# Patient Record
Sex: FEMALE | Race: BLACK OR AFRICAN AMERICAN | NOT HISPANIC OR LATINO | Employment: OTHER | URBAN - METROPOLITAN AREA
[De-identification: names, ages, dates, MRNs, and addresses within clinical notes are randomized per-mention and may not be internally consistent; named-entity substitution may affect disease eponyms.]

---

## 2017-03-12 ENCOUNTER — HOSPITAL ENCOUNTER (EMERGENCY)
Facility: HOSPITAL | Age: 67
Discharge: HOME | End: 2017-03-12
Attending: INTERNAL MEDICINE
Payer: MEDICAID

## 2017-03-12 VITALS
OXYGEN SATURATION: 98 % | BODY MASS INDEX: 22.21 KG/M2 | RESPIRATION RATE: 18 BRPM | DIASTOLIC BLOOD PRESSURE: 76 MMHG | TEMPERATURE: 98 F | HEART RATE: 98 BPM | HEIGHT: 55 IN | WEIGHT: 96 LBS | SYSTOLIC BLOOD PRESSURE: 148 MMHG

## 2017-03-12 DIAGNOSIS — F02.818 DEMENTIA ASSOCIATED WITH OTHER UNDERLYING DISEASE WITH BEHAVIORAL DISTURBANCE: Primary | ICD-10-CM

## 2017-03-12 PROCEDURE — 99283 EMERGENCY DEPT VISIT LOW MDM: CPT

## 2017-03-12 RX ORDER — NAFTIFINE HYDROCHLORIDE 1 MG/G
CREAM TOPICAL 2 TIMES DAILY
COMMUNITY

## 2017-03-12 RX ORDER — RISPERIDONE 1 MG/1
1 TABLET ORAL 2 TIMES DAILY
Qty: 60 TABLET | Refills: 0 | Status: SHIPPED | OUTPATIENT
Start: 2017-03-12

## 2017-03-12 RX ORDER — HYDRALAZINE HYDROCHLORIDE 25 MG/1
25 TABLET, FILM COATED ORAL 2 TIMES DAILY
COMMUNITY

## 2017-03-12 RX ORDER — TESTOSTERONE GEL, 1% 10 MG/G
GEL TRANSDERMAL 2 TIMES DAILY
COMMUNITY

## 2017-03-12 RX ORDER — ASPIRIN 81 MG/1
81 TABLET ORAL DAILY
COMMUNITY

## 2017-03-12 RX ORDER — CARVEDILOL 25 MG/1
25 TABLET ORAL 2 TIMES DAILY WITH MEALS
COMMUNITY

## 2017-03-12 NOTE — ED AVS SNAPSHOT
[facility]  [address] [facility] [address]               Ghada Beaulieu   3/12/2017  5:34 PM   ED    Description:  Female : 1950   Department:  [facility]            Your Care was Coordinated By:     Provider Role From To    Yessi Lagunas MD Attending Provider 17 [id number] --      Reason for Visit     Aggressive Behavior           Diagnoses this Visit        Comments    Dementia associated with other underlying disease with behavioral disturbance    -  Primary       ED Disposition     ED Disposition Condition Comment    Discharge  Patient clinically has no infection.  Seems like has some behavior issues.  Neurology records reviewed.  Patient is off Keppra.  We'll start the patient back on Risperdal which used to be 1 mg twice a day and have the patient follow-up with neurology.           To Do List           Follow-up Information     Follow up with Noe Villatoro MD. Schedule an appointment as soon as possible for a visit in 3 days.    Specialty:  Neurology    Why:  for behavior issues     Contact information:    [address] [facility] [address] [phone]         These Medications        Disp Refills Start End    risperidone (RISPERDAL) 1 MG tablet 60 tablet 0 3/12/2017     Take 1 tablet (1 mg total) by mouth 2 (two) times daily. - Oral      [facility]     [facility] On Call Nurse Care Line -  Assistance  Registered nurses in the [facility] On Call Center provide clinical advisement, health education, appointment booking, and other advisory services.  Call for this free service at [phone].             Medications           Message regarding Medications     Verify the changes and/or additions to your medication regime listed below are the same as discussed with your clinician today.  If any of these changes or additions are incorrect, please notify your healthcare provider.        CHANGE how you are  taking these medications     Start Taking Instead of    risperidone (RISPERDAL) 1 MG tablet risperidone (RISPERDAL) 1 MG tablet    Dosage:  Take 1 tablet (1 mg total) by mouth 2 (two) times daily. Dosage:  Take 1 mg by mouth 2 (two) times daily.      STOP taking these medications     levetiracetam (KEPPRA) 500 MG Tab Take 1 tablet (500 mg total) by mouth 2 (two) times daily.           Verify that the below list of medications is an accurate representation of the medications you are currently taking.  If none reported, the list may be blank. If incorrect, please contact your healthcare provider. Carry this list with you in case of emergency.           Current Medications     albuterol (PROVENTIL) 2.5 mg /3 mL (0.083 %) nebulizer solution Take 2.5 mg by nebulization every 6 (six) hours as needed.    allopurinol (ZYLOPRIM) 100 MG tablet Take 100 mg by mouth once daily.     amlodipine (NORVASC) 5 MG tablet Take 10 mg by mouth once daily.     aspirin (ECOTRIN) 81 MG EC tablet Take 81 mg by mouth once daily.    atorvastatin (LIPITOR) 10 MG tablet Take 10 mg by mouth once daily.    carvedilol (COREG) 25 MG tablet Take 25 mg by mouth 2 (two) times daily with meals.    gabapentin (NEURONTIN) 300 MG capsule Take 300 mg by mouth 3 (three) times daily.    glipiZIDE (GLUCOTROL) 5 MG tablet Take 5 mg by mouth daily with breakfast.     hydrALAZINE (APRESOLINE) 25 MG tablet Take 25 mg by mouth 2 (two) times daily.    hydrochlorothiazide (MICROZIDE) 12.5 mg capsule Take 12.5 mg by mouth once daily.    ipratropium (ATROVENT) 0.02 % nebulizer solution Take 500 mcg by nebulization 4 (four) times daily.    IPRATROPIUM/ALBUTEROL SULFATE (COMBIVENT INHL) Inhale 1 puff into the lungs 2 (two) times daily.     metformin (GLUMETZA) 500 MG (MOD) 24 hr tablet Take 500 mg by mouth 2 (two) times daily with meals.     montelukast (SINGULAIR) 10 mg tablet Take 10 mg by mouth every evening.    naftifine (NAFTIN) 1 % cream Apply topically 2 (two)  "times daily. Aplly small amount twice daily between toes.    testosterone (ANDROGEL) 1 % (50 mg/5 gram) GlPk Apply topically 2 (two) times daily.    valsartan-hydrochlorothiazide (DIOVAN-HCT) 320-12.5 mg per tablet Take 1 tablet by mouth once daily.    guaifenesin 100 mg/5 ml (ROBITUSSIN) 100 mg/5 mL syrup Take 200 mg by mouth 3 (three) times daily as needed.    risperidone (RISPERDAL) 1 MG tablet Take 1 tablet (1 mg total) by mouth 2 (two) times daily.    tolterodine (DETROL) 2 MG Tab Take 1 mg by mouth 2 (two) times daily.    valsartan (DIOVAN) 320 MG tablet Take 320 mg by mouth once daily.           Clinical Reference Information           Your Vitals Were     BP Pulse Temp Resp Height Weight    154/77 (BP Location: Right arm, Patient Position: Sitting) 101 97.9 °F (36.6 °C) (Axillary) 20 4' 6" (1.372 m) 43.5 kg (96 lb)    SpO2 BMI             100% 23.15 kg/m2         Allergies as of 3/12/2017        Reactions    Bactrim [Sulfamethoxazole-trimethoprim]       Immunizations Administered on Date of Encounter - 3/12/2017     None      ED Micro, Lab, POCT     None      ED Imaging Orders     None        Discharge Instructions       Patient clinically has no infection.  Seems like has some behavior issues.  Neurology records reviewed.  Patient is off Keppra.  We'll start the patient back on Risperdal which used to be 1 mg twice a day and have the patient follow-up with neurology.     [facility] complies with applicable Federal civil rights laws and does not discriminate on the basis of race, color, national origin, age, disability, or sex.        Language Assistance Services     ATTENTION: Language assistance services are available, free of charge. Please call [phone].      ATENCIÓN: Si kymberlyla wilberto, tiene a cohen disposición servicios gratuitos de asistencia lingüística. Llame al [phone].     [facility] Ý: N?u b?n nói Ti?ng Vi?t, có các d?ch v? h? tr? ngôn ng? mi?n phí dành cho b?n. G?i s? " [phone].

## 2017-03-12 NOTE — ED NOTES
Patient identifiers verified and correct for Ghada Beaulieu.    LOC: The patient is awake, alert , patient legally blind.  APPEARANCE: Patient  in no acute distress, patient is clean and well groomed, patient's clothing is properly fastened.  SKIN: The skin is warm and dry, color consistent with ethnicity, patient has normal skin turgor and moist mucus membranes, skin intact, no breakdown or bruising noted.  MUSCULOSKELETAL: Patient moving all extremities spontaneously, no obvious swelling or deformities noted.  RESPIRATORY: Airway is open and patent, respirations are spontaneous, patient has a normal effort and rate, no accessory muscle use noted, bilateral breath sounds clear  ABDOMEN: Soft and non tender to palpation, no distention noted, normoactive bowel sounds present in all four quadrants.

## 2017-03-12 NOTE — ED PROVIDER NOTES
"SCRIBE #1 NOTE: I, Jin Asencio, am scribing for, and in the presence of, Yessi Lagunas MD. I have scribed the entire note.      History      Chief Complaint   Patient presents with    Aggressive Behavior     Patients caregiver states patient is non verbal and that shes been hitting herself as if in pain, [place] wanted her to be checked out at the ED        Review of patient's allergies indicates:   Allergen Reactions    Bactrim [sulfamethoxazole-trimethoprim]         HPI   HPI    3/12/2017, 5:35 PM   History obtained from the [facility]      History of Present Illness: Ghada Beaulieu is a 66 y.o. female patient who presents to the Emergency Department for an evaluation of aggressive behavior which onset gradually x2 months ago. Symptoms are episodic and moderate in severity. Pt was accompanied by her sitter who informed me that pt is non-verbal and has dementia. Pt has been reportedly hitting herself and "jumping up and down" in her chair. Pt's sitter states this behavior is new and she brought pt in for an evaluation of her persisting sx. No further complaints or concerns at this time.         Arrival mode: Personal vehicle    PCP: Stacie Ramachandran MD       Past Medical History:  Past Medical History:   Diagnosis Date    Blindness     Bradycardia     Cardiomegaly     Cataract     Cerebral atrophy     Cerebral palsy     Congenital heart disease     Fibrocystic breast     Heart block     Hepatitis B carrier     HTN (hypertension)     Mycotic toenails     Onychomycosis        Past Surgical History:  History reviewed. No pertinent surgical history.      Family History:  Family History   Problem Relation Age of Onset    Family history unknown: Yes       Social History:  Social History     Social History Main Topics    Smoking status: Never Smoker    Smokeless tobacco: Not on file    Alcohol use No    Drug use: No    Sexual activity: No       ROS   Review of Systems   Unable to perform ROS: " "Dementia (Pt is also non-verbal)       Physical Exam    Initial Vitals   BP Pulse Resp Temp SpO2   03/12/17 1723 03/12/17 1723 03/12/17 1723 03/12/17 1723 03/12/17 1723   154/77 101 20 97.9 °F (36.6 °C) 100 %      Physical Exam  Nursing Notes and Vital Signs Reviewed.  Constitutional: Patient is in no acute distress. Awake and at baseline. Well-developed and well-nourished.   Head: Atraumatic. Normocephalic.  Eyes: Pt is blind in both eyes. Conjunctivae are not pale. No scleral icterus.  ENT: Mucous membranes are moist. Oropharynx is clear and symmetric.    Neck: Supple. Full ROM. No lymphadenopathy.  Cardiovascular: Tachycardia. Dystonic and systolic murmurs noted. No rubs or gallops. Distal pulses are 2+ and symmetric.  Pulmonary/Chest: No respiratory distress. Clear to auscultation bilaterally. No wheezing, rales, or rhonchi.  Abdominal: Soft and non-distended.  There is no tenderness.  No rebound, guarding, or rigidity. Good bowel sounds.  Musculoskeletal: Moves all extremities. No obvious deformities. No edema. No calf tenderness.  Skin: Warm and dry.  Neurological:  Awake and at baseline.  Pt is non-verbal.  No acute focal neurological deficits are appreciated.  Psychiatric: Normal affect. Good eye contact. Appropriate in content.    ED Course    Procedures  ED Vital Signs:  Vitals:    03/12/17 1723   BP: (!) 154/77   Pulse: 101   Resp: 20   Temp: 97.9 °F (36.6 °C)   TempSrc: Axillary   SpO2: 100%   Weight: 43.5 kg (96 lb)   Height: 4' 6" (1.372 m)              The Emergency Provider reviewed the vital signs and test results, which are outlined above.    ED Discussion     8:40 PM: Reassessed pt at this time. Pt is awake, alert, and in NAD. Pt's sitter states her condition has improved at this time. Discussed with pt all pertinent ED information and results. Discussed pt dx of dementia with other underlying disease with behavioral disturbance and plan of tx. Gave pt all f/u and return to the ED instructions. " All questions and concerns were addressed at this time. Pt expresses understanding of information and instructions, and is comfortable with plan to discharge. Pt is stable for discharge.    Pre-hypertension/Hypertension: The pt has been informed that they may have pre-hypertension or hypertension based on a blood pressure reading in the ED. I recommend that the pt call the PCP listed on their discharge instructions or a physician of their choice this week to arrange f/u for further evaluation of possible pre-hypertension or hypertension.       ED Medication(s):  Medications - No data to display    New Prescriptions    No medications on file       Follow-up Information     Follow up with Noe Villatoro MD. Schedule an appointment as soon as possible for a visit in 3 days.    Specialty:  Neurology    Why:  for behavior issues     Contact information:    [address] [facility] [address] [phone]              Medical Decision Making              Scribe Attestation:   Scribe #1: I performed the above scribed service and the documentation accurately describes the services I performed. I attest to the accuracy of the note.    Attending:   Physician Attestation Statement for Scribe #1: I, Yessi Lagunas MD, personally performed the services described in this documentation, as scribed by Jin sAencio, in my presence, and it is both accurate and complete.          Clinical Impression       ICD-10-CM ICD-9-CM   1. Dementia associated with other underlying disease with behavioral disturbance F02.81 294.8     294.11       Disposition:   Disposition: Discharged  Condition: Stable    Patient clinically has no infection.  Seems like has some behavior issues.  Neurology records reviewed.  Patient is off Keppra.  We'll start the patient back on Risperdal which used to be 1 mg twice a day and have the patient follow-up with neurology.    MD Yessi Cuevas MD  03/12/17 [id number]

## 2017-03-13 NOTE — DISCHARGE INSTRUCTIONS
Patient clinically has no infection.  Seems like has some behavior issues.  Neurology records reviewed.  Patient is off Keppra.  We'll start the patient back on Risperdal which used to be 1 mg twice a day and have the patient follow-up with neurology.

## 2017-05-10 ENCOUNTER — HOSPITAL ENCOUNTER (INPATIENT)
Facility: HOSPITAL | Age: 67
LOS: 1 days | Discharge: HOME | DRG: 202 | End: 2017-05-12
Attending: EMERGENCY MEDICINE | Admitting: FAMILY MEDICINE
Payer: MEDICAID

## 2017-05-10 DIAGNOSIS — R06.02 SOB (SHORTNESS OF BREATH): ICD-10-CM

## 2017-05-10 DIAGNOSIS — J45.901 ASTHMA EXACERBATION: ICD-10-CM

## 2017-05-10 DIAGNOSIS — J45.41 MODERATE PERSISTENT ASTHMA WITH ACUTE EXACERBATION: ICD-10-CM

## 2017-05-10 DIAGNOSIS — J45.909 ASTHMA: Primary | ICD-10-CM

## 2017-05-10 DIAGNOSIS — R79.89 ELEVATED TROPONIN: ICD-10-CM

## 2017-05-10 PROBLEM — I20.89 ANGINAL EQUIVALENT: Status: ACTIVE | Noted: 2017-05-10

## 2017-05-10 PROBLEM — I10 BENIGN ESSENTIAL HTN: Status: ACTIVE | Noted: 2017-05-10

## 2017-05-10 LAB
ALBUMIN SERPL BCP-MCNC: 3.8 G/DL
ALP SERPL-CCNC: 66 U/L
ALT SERPL W/O P-5'-P-CCNC: 46 U/L
ANION GAP SERPL CALC-SCNC: 9 MMOL/L
AST SERPL-CCNC: 34 U/L
BASOPHILS # BLD AUTO: 0.02 K/UL
BASOPHILS NFR BLD: 0.3 %
BILIRUB SERPL-MCNC: 1.3 MG/DL
BUN SERPL-MCNC: 21 MG/DL
CALCIUM SERPL-MCNC: 10 MG/DL
CHLORIDE SERPL-SCNC: 105 MMOL/L
CO2 SERPL-SCNC: 27 MMOL/L
CREAT SERPL-MCNC: 0.9 MG/DL
DIFFERENTIAL METHOD: ABNORMAL
EOSINOPHIL # BLD AUTO: 0.1 K/UL
EOSINOPHIL NFR BLD: 1.7 %
ERYTHROCYTE [DISTWIDTH] IN BLOOD BY AUTOMATED COUNT: 14.5 %
EST. GFR  (AFRICAN AMERICAN): >60 ML/MIN/1.73 M^2
EST. GFR  (NON AFRICAN AMERICAN): >60 ML/MIN/1.73 M^2
ESTIMATED PA SYSTOLIC PRESSURE: 50
GLOBAL PERICARDIAL EFFUSION: ABNORMAL
GLUCOSE SERPL-MCNC: 114 MG/DL
HCT VFR BLD AUTO: 33 %
HGB BLD-MCNC: 10.9 G/DL
LYMPHOCYTES # BLD AUTO: 1.3 K/UL
LYMPHOCYTES NFR BLD: 21.3 %
MCH RBC QN AUTO: 30.2 PG
MCHC RBC AUTO-ENTMCNC: 33 %
MCV RBC AUTO: 91 FL
MONOCYTES # BLD AUTO: 1.2 K/UL
MONOCYTES NFR BLD: 20.4 %
NEUTROPHILS # BLD AUTO: 3.4 K/UL
NEUTROPHILS NFR BLD: 56.3 %
PLATELET # BLD AUTO: 134 K/UL
PMV BLD AUTO: 10.5 FL
POCT GLUCOSE: 388 MG/DL (ref 70–110)
POTASSIUM SERPL-SCNC: 4.2 MMOL/L
PROT SERPL-MCNC: 7.7 G/DL
RBC # BLD AUTO: 3.61 M/UL
RETIRED EF AND QEF - SEE NOTES: 65 (ref 55–65)
SODIUM SERPL-SCNC: 141 MMOL/L
TRICUSPID VALVE REGURGITATION: ABNORMAL
TROPONIN I SERPL DL<=0.01 NG/ML-MCNC: 0.02 NG/ML
TROPONIN I SERPL DL<=0.01 NG/ML-MCNC: 0.04 NG/ML
WBC # BLD AUTO: 6.02 K/UL

## 2017-05-10 PROCEDURE — 99285 EMERGENCY DEPT VISIT HI MDM: CPT | Mod: 25

## 2017-05-10 PROCEDURE — 25000003 PHARM REV CODE 250: Performed by: EMERGENCY MEDICINE

## 2017-05-10 PROCEDURE — 84484 ASSAY OF TROPONIN QUANT: CPT | Mod: 91

## 2017-05-10 PROCEDURE — 63600175 PHARM REV CODE 636 W HCPCS: Performed by: EMERGENCY MEDICINE

## 2017-05-10 PROCEDURE — G0378 HOSPITAL OBSERVATION PER HR: HCPCS

## 2017-05-10 PROCEDURE — 25000242 PHARM REV CODE 250 ALT 637 W/ HCPCS: Performed by: NURSE PRACTITIONER

## 2017-05-10 PROCEDURE — 93306 TTE W/DOPPLER COMPLETE: CPT | Mod: 26,,, | Performed by: INTERNAL MEDICINE

## 2017-05-10 PROCEDURE — 21400001 HC TELEMETRY ROOM

## 2017-05-10 PROCEDURE — 93306 TTE W/DOPPLER COMPLETE: CPT

## 2017-05-10 PROCEDURE — 80053 COMPREHEN METABOLIC PANEL: CPT

## 2017-05-10 PROCEDURE — 25000003 PHARM REV CODE 250: Performed by: NURSE PRACTITIONER

## 2017-05-10 PROCEDURE — 99204 OFFICE O/P NEW MOD 45 MIN: CPT | Mod: ,,, | Performed by: INTERNAL MEDICINE

## 2017-05-10 PROCEDURE — 25000242 PHARM REV CODE 250 ALT 637 W/ HCPCS: Performed by: EMERGENCY MEDICINE

## 2017-05-10 PROCEDURE — 96372 THER/PROPH/DIAG INJ SC/IM: CPT

## 2017-05-10 PROCEDURE — 36415 COLL VENOUS BLD VENIPUNCTURE: CPT

## 2017-05-10 PROCEDURE — 85025 COMPLETE CBC W/AUTO DIFF WBC: CPT

## 2017-05-10 PROCEDURE — 63600175 PHARM REV CODE 636 W HCPCS: Performed by: NURSE PRACTITIONER

## 2017-05-10 PROCEDURE — 63600175 PHARM REV CODE 636 W HCPCS: Performed by: FAMILY MEDICINE

## 2017-05-10 PROCEDURE — 94640 AIRWAY INHALATION TREATMENT: CPT

## 2017-05-10 PROCEDURE — 84484 ASSAY OF TROPONIN QUANT: CPT

## 2017-05-10 PROCEDURE — 96374 THER/PROPH/DIAG INJ IV PUSH: CPT

## 2017-05-10 PROCEDURE — 93010 ELECTROCARDIOGRAM REPORT: CPT | Mod: ,,, | Performed by: INTERNAL MEDICINE

## 2017-05-10 RX ORDER — VALSARTAN AND HYDROCHLOROTHIAZIDE 320; 12.5 MG/1; MG/1
1 TABLET, FILM COATED ORAL DAILY
Status: DISCONTINUED | OUTPATIENT
Start: 2017-05-11 | End: 2017-05-10 | Stop reason: CLARIF

## 2017-05-10 RX ORDER — ACETAMINOPHEN 325 MG/1
650 TABLET ORAL EVERY 8 HOURS PRN
Status: DISCONTINUED | OUTPATIENT
Start: 2017-05-10 | End: 2017-05-10 | Stop reason: SDUPTHER

## 2017-05-10 RX ORDER — IPRATROPIUM BROMIDE AND ALBUTEROL SULFATE 2.5; .5 MG/3ML; MG/3ML
3 SOLUTION RESPIRATORY (INHALATION)
Status: DISCONTINUED | OUTPATIENT
Start: 2017-05-10 | End: 2017-05-12 | Stop reason: HOSPADM

## 2017-05-10 RX ORDER — FAMOTIDINE 20 MG/1
20 TABLET, FILM COATED ORAL 2 TIMES DAILY
Status: DISCONTINUED | OUTPATIENT
Start: 2017-05-10 | End: 2017-05-10 | Stop reason: SDUPTHER

## 2017-05-10 RX ORDER — CARVEDILOL 12.5 MG/1
25 TABLET ORAL 2 TIMES DAILY WITH MEALS
Status: DISCONTINUED | OUTPATIENT
Start: 2017-05-10 | End: 2017-05-12 | Stop reason: HOSPADM

## 2017-05-10 RX ORDER — GABAPENTIN 300 MG/1
300 CAPSULE ORAL 3 TIMES DAILY
Status: DISCONTINUED | OUTPATIENT
Start: 2017-05-10 | End: 2017-05-12 | Stop reason: HOSPADM

## 2017-05-10 RX ORDER — ACETAMINOPHEN 325 MG/1
650 TABLET ORAL EVERY 6 HOURS PRN
Status: DISCONTINUED | OUTPATIENT
Start: 2017-05-10 | End: 2017-05-12 | Stop reason: HOSPADM

## 2017-05-10 RX ORDER — AMLODIPINE BESYLATE 10 MG/1
10 TABLET ORAL DAILY
Status: DISCONTINUED | OUTPATIENT
Start: 2017-05-11 | End: 2017-05-12 | Stop reason: HOSPADM

## 2017-05-10 RX ORDER — HYDRALAZINE HYDROCHLORIDE 25 MG/1
25 TABLET, FILM COATED ORAL 2 TIMES DAILY
Status: DISCONTINUED | OUTPATIENT
Start: 2017-05-10 | End: 2017-05-12 | Stop reason: HOSPADM

## 2017-05-10 RX ORDER — HYDROCHLOROTHIAZIDE 12.5 MG/1
12.5 TABLET ORAL DAILY
Status: DISCONTINUED | OUTPATIENT
Start: 2017-05-11 | End: 2017-05-12 | Stop reason: HOSPADM

## 2017-05-10 RX ORDER — ENOXAPARIN SODIUM 100 MG/ML
40 INJECTION SUBCUTANEOUS EVERY 24 HOURS
Status: DISCONTINUED | OUTPATIENT
Start: 2017-05-10 | End: 2017-05-10 | Stop reason: SDUPTHER

## 2017-05-10 RX ORDER — ALLOPURINOL 100 MG/1
100 TABLET ORAL DAILY
Status: DISCONTINUED | OUTPATIENT
Start: 2017-05-11 | End: 2017-05-12 | Stop reason: HOSPADM

## 2017-05-10 RX ORDER — IPRATROPIUM BROMIDE AND ALBUTEROL SULFATE 2.5; .5 MG/3ML; MG/3ML
3 SOLUTION RESPIRATORY (INHALATION)
Status: COMPLETED | OUTPATIENT
Start: 2017-05-10 | End: 2017-05-10

## 2017-05-10 RX ORDER — MONTELUKAST SODIUM 10 MG/1
10 TABLET ORAL NIGHTLY
Status: DISCONTINUED | OUTPATIENT
Start: 2017-05-10 | End: 2017-05-12 | Stop reason: HOSPADM

## 2017-05-10 RX ORDER — IPRATROPIUM BROMIDE AND ALBUTEROL SULFATE 2.5; .5 MG/3ML; MG/3ML
3 SOLUTION RESPIRATORY (INHALATION) EVERY 4 HOURS
Status: DISCONTINUED | OUTPATIENT
Start: 2017-05-10 | End: 2017-05-10 | Stop reason: SDUPTHER

## 2017-05-10 RX ORDER — VALSARTAN 80 MG/1
320 TABLET ORAL DAILY
Status: DISCONTINUED | OUTPATIENT
Start: 2017-05-11 | End: 2017-05-12 | Stop reason: HOSPADM

## 2017-05-10 RX ORDER — ASPIRIN 325 MG
325 TABLET, DELAYED RELEASE (ENTERIC COATED) ORAL
Status: COMPLETED | OUTPATIENT
Start: 2017-05-10 | End: 2017-05-10

## 2017-05-10 RX ORDER — RISPERIDONE 1 MG/1
1 TABLET ORAL 2 TIMES DAILY
Status: DISCONTINUED | OUTPATIENT
Start: 2017-05-10 | End: 2017-05-12 | Stop reason: HOSPADM

## 2017-05-10 RX ORDER — ATORVASTATIN CALCIUM 10 MG/1
10 TABLET, FILM COATED ORAL DAILY
Status: DISCONTINUED | OUTPATIENT
Start: 2017-05-11 | End: 2017-05-12 | Stop reason: HOSPADM

## 2017-05-10 RX ORDER — IBUPROFEN 200 MG
24 TABLET ORAL
Status: DISCONTINUED | OUTPATIENT
Start: 2017-05-10 | End: 2017-05-12 | Stop reason: HOSPADM

## 2017-05-10 RX ORDER — ALBUTEROL SULFATE 2.5 MG/.5ML
2.5 SOLUTION RESPIRATORY (INHALATION)
Status: ACTIVE | OUTPATIENT
Start: 2017-05-10 | End: 2017-05-10

## 2017-05-10 RX ORDER — ONDANSETRON 2 MG/ML
4 INJECTION INTRAMUSCULAR; INTRAVENOUS EVERY 12 HOURS PRN
Status: DISCONTINUED | OUTPATIENT
Start: 2017-05-10 | End: 2017-05-12 | Stop reason: HOSPADM

## 2017-05-10 RX ORDER — IBUPROFEN 200 MG
16 TABLET ORAL
Status: DISCONTINUED | OUTPATIENT
Start: 2017-05-10 | End: 2017-05-12 | Stop reason: HOSPADM

## 2017-05-10 RX ORDER — INSULIN ASPART 100 [IU]/ML
0-5 INJECTION, SOLUTION INTRAVENOUS; SUBCUTANEOUS
Status: DISCONTINUED | OUTPATIENT
Start: 2017-05-10 | End: 2017-05-11

## 2017-05-10 RX ORDER — GLUCAGON 1 MG
1 KIT INJECTION
Status: DISCONTINUED | OUTPATIENT
Start: 2017-05-10 | End: 2017-05-12 | Stop reason: HOSPADM

## 2017-05-10 RX ORDER — ENOXAPARIN SODIUM 100 MG/ML
40 INJECTION SUBCUTANEOUS EVERY 24 HOURS
Status: DISCONTINUED | OUTPATIENT
Start: 2017-05-10 | End: 2017-05-12 | Stop reason: HOSPADM

## 2017-05-10 RX ORDER — AMOXICILLIN 250 MG
1 CAPSULE ORAL 2 TIMES DAILY
Status: DISCONTINUED | OUTPATIENT
Start: 2017-05-10 | End: 2017-05-12 | Stop reason: HOSPADM

## 2017-05-10 RX ORDER — FAMOTIDINE 20 MG/1
20 TABLET, FILM COATED ORAL DAILY
Status: DISCONTINUED | OUTPATIENT
Start: 2017-05-10 | End: 2017-05-12 | Stop reason: HOSPADM

## 2017-05-10 RX ORDER — ASPIRIN 81 MG/1
81 TABLET ORAL DAILY
Status: DISCONTINUED | OUTPATIENT
Start: 2017-05-11 | End: 2017-05-12 | Stop reason: HOSPADM

## 2017-05-10 RX ADMIN — FAMOTIDINE 20 MG: 20 TABLET ORAL at 05:05

## 2017-05-10 RX ADMIN — MONTELUKAST SODIUM 10 MG: 10 TABLET, FILM COATED ORAL at 10:05

## 2017-05-10 RX ADMIN — IPRATROPIUM BROMIDE AND ALBUTEROL SULFATE 3 ML: .5; 3 SOLUTION RESPIRATORY (INHALATION) at 01:05

## 2017-05-10 RX ADMIN — METHYLPREDNISOLONE SODIUM SUCCINATE 125 MG: 125 INJECTION, POWDER, FOR SOLUTION INTRAMUSCULAR; INTRAVENOUS at 01:05

## 2017-05-10 RX ADMIN — IPRATROPIUM BROMIDE AND ALBUTEROL SULFATE 3 ML: .5; 3 SOLUTION RESPIRATORY (INHALATION) at 04:05

## 2017-05-10 RX ADMIN — RISPERIDONE 1 MG: 1 TABLET, FILM COATED ORAL at 10:05

## 2017-05-10 RX ADMIN — INSULIN ASPART 3 UNITS: 100 INJECTION, SOLUTION INTRAVENOUS; SUBCUTANEOUS at 10:05

## 2017-05-10 RX ADMIN — ENOXAPARIN SODIUM 40 MG: 100 INJECTION SUBCUTANEOUS at 05:05

## 2017-05-10 RX ADMIN — IPRATROPIUM BROMIDE AND ALBUTEROL SULFATE 3 ML: .5; 3 SOLUTION RESPIRATORY (INHALATION) at 07:05

## 2017-05-10 RX ADMIN — REGULAR STRENGTH 325 MG: 325 TABLET ORAL at 02:05

## 2017-05-10 RX ADMIN — GABAPENTIN 300 MG: 300 CAPSULE ORAL at 10:05

## 2017-05-10 RX ADMIN — STANDARDIZED SENNA CONCENTRATE AND DOCUSATE SODIUM 1 TABLET: 8.6; 5 TABLET, FILM COATED ORAL at 10:05

## 2017-05-10 RX ADMIN — CARVEDILOL 25 MG: 12.5 TABLET, FILM COATED ORAL at 06:05

## 2017-05-10 RX ADMIN — METHYLPREDNISOLONE SODIUM SUCCINATE 40 MG: 125 INJECTION, POWDER, FOR SOLUTION INTRAMUSCULAR; INTRAVENOUS at 11:05

## 2017-05-10 RX ADMIN — METHYLPREDNISOLONE SODIUM SUCCINATE 40 MG: 125 INJECTION, POWDER, FOR SOLUTION INTRAMUSCULAR; INTRAVENOUS at 06:05

## 2017-05-10 NOTE — ED PROVIDER NOTES
SCRIBE #1 NOTE: I, Salvatore Verma, am scribing for, and in the presence of, Helena Schrader MD. I have scribed the entire note.      History      Chief Complaint   Patient presents with    Shortness of Breath     Patient c/o SOB and wheezing for the last 2 days       Review of patient's allergies indicates:   Allergen Reactions    Bactrim [sulfamethoxazole-trimethoprim]         HPI   HPI    5/10/2017, 1:12 PM   History obtained from the [facility]      History of Present Illness limited due to pt non verbal : Ghada Beaulieu is a 66 y.o. female patient with a PMHx of asthma, cerebral palsy, who presents to the Emergency Department for wheezing which onset gradually 2 days ago. Sxs are constant and moderate in severity. There are no mitigating or exacerbating factors noted. Associated sxs include cough. Family denies any fever, N/V/D, CP, LOC, fall, and all other sxs at this time. No further complaints or concerns at this time.       Arrival mode: Personal vehicle      PCP: Stacie Ramachandran MD       Past Medical History:  Past Medical History:   Diagnosis Date    Blindness     Bradycardia     Cardiomegaly     Cataract     Cerebral atrophy     Cerebral palsy     Congenital heart disease     Fibrocystic breast     Heart block     Hepatitis B carrier     HTN (hypertension)     Mycotic toenails     Onychomycosis        Past Surgical History:  History reviewed. No pertinent surgical history.      Family History:  Family History   Problem Relation Age of Onset    Family history unknown: Yes       Social History:  Social History     Social History Main Topics    Smoking status: Never Smoker    Smokeless tobacco: unknown    Alcohol use No    Drug use: No    Sexual activity: No       ROS   Review of Systems   Unable to perform ROS: Patient nonverbal       Physical Exam    Initial Vitals   BP Pulse Resp Temp SpO2   05/10/17 1308 05/10/17 1308 05/10/17 1308 05/10/17 1308 05/10/17 1308   167/69  "93 22 98.4 °F (36.9 °C) 94 %      Physical Exam  Nursing Notes and Vital Signs Reviewed.  Constitutional: Patient is in no acute distress. Awake and alert. Well-developed and well-nourished.  Head: Atraumatic. Normocephalic.  Eyes: PERRL. EOM intact. Conjunctivae are not pale. No scleral icterus.  ENT: Mucous membranes are moist. Oropharynx is clear and symmetric.    Neck: Supple. Full ROM. No lymphadenopathy.  Cardiovascular: Regular rate. Regular rhythm. No murmurs, rubs, or gallops. Distal pulses are 2+ and symmetric.  Pulmonary/Chest: No respiratory distress. Expiratory wheezing. No rales, or rhonchi.  Abdominal: Soft and non-distended.  There is no tenderness.  No rebound, guarding, or rigidity. Good bowel sounds.  Genitourinary: No CVA tenderness  Musculoskeletal: Moves all extremities. No obvious deformities. No edema. No calf tenderness.  Skin: Warm and dry.  Neurological:  Alert, awake, and appropriate.  Normal speech.  No acute focal neurological deficits are appreciated.  Psychiatric: Normal affect. Good eye contact. Appropriate in content.    ED Course    Procedures  ED Vital Signs:  Vitals:    05/10/17 1308 05/10/17 1334 05/10/17 1337 05/10/17 1341   BP: (!) 167/69      Pulse: 93 89 88 89   Resp: (!) 22 20 (!) 22 20   Temp: 98.4 °F (36.9 °C)      TempSrc: Tympanic      SpO2: (!) 94% 100% 100% 100%   Weight: 39.5 kg (87 lb)      Height: 4' 8" (1.422 m)       05/10/17 1532   BP: (!) 146/79   Pulse: 94   Resp: (!) 21   Temp:    TempSrc:    SpO2: 96%   Weight:    Height:        Abnormal Lab Results:  Labs Reviewed   CBC W/ AUTO DIFFERENTIAL - Abnormal; Notable for the following:        Result Value    RBC 3.61 (*)     Hemoglobin 10.9 (*)     Hematocrit 33.0 (*)     Platelets 134 (*)     Mono # 1.2 (*)     Mono% 20.4 (*)     All other components within normal limits   COMPREHENSIVE METABOLIC PANEL - Abnormal; Notable for the following:     Glucose 114 (*)     Total Bilirubin 1.3 (*)     ALT 46 (*)     All " other components within normal limits   TROPONIN I - Abnormal; Notable for the following:     Troponin I 0.038 (*)     All other components within normal limits        All Lab Results:  Results for orders placed or performed during the hospital encounter of 05/10/17   CBC auto differential   Result Value Ref Range    WBC 6.02 3.90 - 12.70 K/uL    RBC 3.61 (L) 4.00 - 5.40 M/uL    Hemoglobin 10.9 (L) 12.0 - 16.0 g/dL    Hematocrit 33.0 (L) 37.0 - 48.5 %    MCV 91 82 - 98 fL    MCH 30.2 27.0 - 31.0 pg    MCHC 33.0 32.0 - 36.0 %    RDW 14.5 11.5 - 14.5 %    Platelets 134 (L) 150 - 350 K/uL    MPV 10.5 9.2 - 12.9 fL    Gran # 3.4 1.8 - 7.7 K/uL    Lymph # 1.3 1.0 - 4.8 K/uL    Mono # 1.2 (H) 0.3 - 1.0 K/uL    Eos # 0.1 0.0 - 0.5 K/uL    Baso # 0.02 0.00 - 0.20 K/uL    Gran% 56.3 38.0 - 73.0 %    Lymph% 21.3 18.0 - 48.0 %    Mono% 20.4 (H) 4.0 - 15.0 %    Eosinophil% 1.7 0.0 - 8.0 %    Basophil% 0.3 0.0 - 1.9 %    Differential Method Automated    Comprehensive metabolic panel   Result Value Ref Range    Sodium 141 136 - 145 mmol/L    Potassium 4.2 3.5 - 5.1 mmol/L    Chloride 105 95 - 110 mmol/L    CO2 27 23 - 29 mmol/L    Glucose 114 (H) 70 - 110 mg/dL    BUN, Bld 21 8 - 23 mg/dL    Creatinine 0.9 0.5 - 1.4 mg/dL    Calcium 10.0 8.7 - 10.5 mg/dL    Total Protein 7.7 6.0 - 8.4 g/dL    Albumin 3.8 3.5 - 5.2 g/dL    Total Bilirubin 1.3 (H) 0.1 - 1.0 mg/dL    Alkaline Phosphatase 66 55 - 135 U/L    AST 34 10 - 40 U/L    ALT 46 (H) 10 - 44 U/L    Anion Gap 9 8 - 16 mmol/L    eGFR if African American >60 >60 mL/min/1.73 m^2    eGFR if non African American >60 >60 mL/min/1.73 m^2   Troponin I #1   Result Value Ref Range    Troponin I 0.038 (H) 0.000 - 0.026 ng/mL         Imaging Results:  Imaging Results         X-Ray Chest AP Portable (Final result) Result time:  05/10/17 14:16:31    Final result by Ash Watts MD (05/10/17 14:16:31)    Impression:     Cardiomegaly..      Electronically signed by: ASH WATTS  MD  Date:     05/10/17  Time:    14:16     Narrative:    History: Chest pain    Cardiomegaly which appears more pronounced compared to 2/1/16. No infiltrates.                    The EKG was ordered, reviewed, and independently interpreted by the ED provider.  Interpretation time: 13:15  Rate: 92 BPM  Rhythm: normal sinus rhythm  Interpretation: TONY. RVH. No STEMI.      The Emergency Provider reviewed the vital signs and test results, which are outlined above.    ED Discussion     5/10/2017 1400 PM Pt continues to wheeze, moving more air after initial treatment    2:31 PM: Dr. Riley discussed the pt's case with Dr. Tena (Cardiology) who agrees with plan of Tx and will see pt in ED.    2:46 PM: Discussed case with Jennifer Moreno NP (Hospital Medicine). Jennifer agrees with current care and management of pt and accepts admission.   Admitting Service: Hospital medicine   Admitting Physician: Dr. Renner  Admit to: Observation    2:46 PM: Re-evaluated pt. I have discussed test results, shared treatment plan, and the need for admission with family at bedside. Family express understanding at this time and agree with all information. All questions answered. Family has no further questions or concerns at this time. Pt is ready for admit.      ED Medication(s):  Medications   albuterol sulfate nebulizer solution 2.5 mg ( Nebulization Canceled Entry 5/10/17 1410)   methylPREDNISolone sod suc(PF) 125 mg/2 mL injection 125 mg (125 mg Intravenous Given 5/10/17 1340)   albuterol-ipratropium 2.5mg-0.5mg/3mL nebulizer solution 3 mL (3 mLs Nebulization Given by Other 5/10/17 1341)   aspirin EC tablet 325 mg (325 mg Oral Given 5/10/17 1446)       New Prescriptions    No medications on file             Medical Decision Making    Medical Decision Making:   Clinical Tests:   Lab Tests: Ordered and Reviewed  Radiological Study: Reviewed and Ordered  Medical Tests: Ordered and Reviewed           Scribe Attestation:   Scribe #1: I performed the  above scribed service and the documentation accurately describes the services I performed. I attest to the accuracy of the note.    Attending:   Physician Attestation Statement for Scribe #1: I, Helena Schrader MD, personally performed the services described in this documentation, as scribed by Salvatore Verma, in my presence, and it is both accurate and complete.          Clinical Impression       ICD-10-CM ICD-9-CM   1. SOB (shortness of breath) R06.02 786.05   2. Asthma exacerbation J45.901 493.92   3. Elevated troponin R74.8 790.6       Disposition:   Disposition: Placed in Observation  Condition: Fair         Helena Schrader MD  05/10/17 1543

## 2017-05-10 NOTE — CONSULTS
Consult Note  Cardiology    Consult Requested By: Dr. Schrader  Reason for Consult: SOB, elevated troponin     SUBJECTIVE:     History of Present Illness:  Patient is a 66 y.o. nonverbal  female with PMH of cardiomegaly, CP, blindness,  congenital heart disease, Hep B and HTN. Patient is a resident at a group home. She presented to the ED with SOB and wheezing. Group [place] rep states that patient had sob and wheezing this morning and was given nebulizer treatment. Had improvement in symptoms. She went to her doctors appt and upon returning, symptoms returned. This has been going on for about 2 days now. Staff deny any fever or chills. She is followed by Dr. Blue in [place]. Noted to have mild troponin leak. Has chronic ST abnormality to anterolateral leads. She has been treated with nebulizer and Solumedrol in the ED. CXR is unchanged. 2D echo in Nov 2015 shows normal LVF with no noted  valve abnormalities.     Review of patient's allergies indicates:   Allergen Reactions    Bactrim [sulfamethoxazole-trimethoprim]        Past Medical History:   Diagnosis Date    Blindness     Bradycardia     Cardiomegaly     Cataract     Cerebral atrophy     Cerebral palsy     Congenital heart disease     Fibrocystic breast     Heart block     Hepatitis B carrier     HTN (hypertension)     Mycotic toenails     Onychomycosis      History reviewed. No pertinent surgical history.  Family History   Problem Relation Age of Onset    Family history unknown: Yes     Social History   Substance Use Topics    Smoking status: Never Smoker    Smokeless tobacco: None    Alcohol use No        Review of Systems:  Unable to perform full ROS due to patient being nonverbal. Some history provided by group home staff       Allergic/Immunologic: negative    OBJECTIVE:     Vital Signs (Most Recent)  Temp: 98.4 °F (36.9 °C) (05/10/17 1308)  Pulse: 94 (05/10/17 1532)  Resp: (!) 21 (05/10/17 1532)  BP: (!) 146/79 (05/10/17 1532)  SpO2: 96  % (05/10/17 1532)    Vital Signs Range (Last 24H):  Temp:  [98.4 °F (36.9 °C)]   Pulse:  [88-94]   Resp:  [20-22]   BP: (146-167)/(69-79)   SpO2:  [94 %-100 %]     Current Facility-Administered Medications   Medication    acetaminophen tablet 650 mg    albuterol-ipratropium 2.5mg-0.5mg/3mL nebulizer solution 3 mL    enoxaparin injection 40 mg    famotidine tablet 20 mg    ondansetron injection 4 mg    senna-docusate 8.6-50 mg per tablet 1 tablet     Current Outpatient Prescriptions   Medication Sig    albuterol (PROVENTIL) 2.5 mg /3 mL (0.083 %) nebulizer solution Take 2.5 mg by nebulization every 6 (six) hours as needed.    allopurinol (ZYLOPRIM) 100 MG tablet Take 100 mg by mouth once daily.     amlodipine (NORVASC) 5 MG tablet Take 10 mg by mouth once daily.     aspirin (ECOTRIN) 81 MG EC tablet Take 81 mg by mouth once daily.    atorvastatin (LIPITOR) 10 MG tablet Take 10 mg by mouth once daily.    carvedilol (COREG) 25 MG tablet Take 25 mg by mouth 2 (two) times daily with meals.    gabapentin (NEURONTIN) 300 MG capsule Take 300 mg by mouth 3 (three) times daily.    glipiZIDE (GLUCOTROL) 5 MG tablet Take 5 mg by mouth daily with breakfast.     guaifenesin 100 mg/5 ml (ROBITUSSIN) 100 mg/5 mL syrup Take 200 mg by mouth 3 (three) times daily as needed.    hydrALAZINE (APRESOLINE) 25 MG tablet Take 25 mg by mouth 2 (two) times daily.    hydrochlorothiazide (MICROZIDE) 12.5 mg capsule Take 12.5 mg by mouth once daily.    ipratropium (ATROVENT) 0.02 % nebulizer solution Take 500 mcg by nebulization 4 (four) times daily.    IPRATROPIUM/ALBUTEROL SULFATE (COMBIVENT INHL) Inhale 1 puff into the lungs 2 (two) times daily.     metformin (GLUMETZA) 500 MG (MOD) 24 hr tablet Take 500 mg by mouth 2 (two) times daily with meals.     montelukast (SINGULAIR) 10 mg tablet Take 10 mg by mouth every evening.    naftifine (NAFTIN) 1 % cream Apply topically 2 (two) times daily. Aplly small amount twice daily  between toes.    risperidone (RISPERDAL) 1 MG tablet Take 1 tablet (1 mg total) by mouth 2 (two) times daily.    testosterone (ANDROGEL) 1 % (50 mg/5 gram) GlPk Apply topically 2 (two) times daily.    tolterodine (DETROL) 2 MG Tab Take 1 mg by mouth 2 (two) times daily.    valsartan (DIOVAN) 320 MG tablet Take 320 mg by mouth once daily.    valsartan-hydrochlorothiazide (DIOVAN-HCT) 320-12.5 mg per tablet Take 1 tablet by mouth once daily.     No current facility-administered medications on file prior to encounter.      Current Outpatient Prescriptions on File Prior to Encounter   Medication Sig    albuterol (PROVENTIL) 2.5 mg /3 mL (0.083 %) nebulizer solution Take 2.5 mg by nebulization every 6 (six) hours as needed.    allopurinol (ZYLOPRIM) 100 MG tablet Take 100 mg by mouth once daily.     amlodipine (NORVASC) 5 MG tablet Take 10 mg by mouth once daily.     aspirin (ECOTRIN) 81 MG EC tablet Take 81 mg by mouth once daily.    atorvastatin (LIPITOR) 10 MG tablet Take 10 mg by mouth once daily.    carvedilol (COREG) 25 MG tablet Take 25 mg by mouth 2 (two) times daily with meals.    gabapentin (NEURONTIN) 300 MG capsule Take 300 mg by mouth 3 (three) times daily.    glipiZIDE (GLUCOTROL) 5 MG tablet Take 5 mg by mouth daily with breakfast.     guaifenesin 100 mg/5 ml (ROBITUSSIN) 100 mg/5 mL syrup Take 200 mg by mouth 3 (three) times daily as needed.    hydrALAZINE (APRESOLINE) 25 MG tablet Take 25 mg by mouth 2 (two) times daily.    hydrochlorothiazide (MICROZIDE) 12.5 mg capsule Take 12.5 mg by mouth once daily.    ipratropium (ATROVENT) 0.02 % nebulizer solution Take 500 mcg by nebulization 4 (four) times daily.    IPRATROPIUM/ALBUTEROL SULFATE (COMBIVENT INHL) Inhale 1 puff into the lungs 2 (two) times daily.     metformin (GLUMETZA) 500 MG (MOD) 24 hr tablet Take 500 mg by mouth 2 (two) times daily with meals.     montelukast (SINGULAIR) 10 mg tablet Take 10 mg by mouth every evening.     naftifine (NAFTIN) 1 % cream Apply topically 2 (two) times daily. Aplly small amount twice daily between toes.    risperidone (RISPERDAL) 1 MG tablet Take 1 tablet (1 mg total) by mouth 2 (two) times daily.    testosterone (ANDROGEL) 1 % (50 mg/5 gram) GlPk Apply topically 2 (two) times daily.    tolterodine (DETROL) 2 MG Tab Take 1 mg by mouth 2 (two) times daily.    valsartan (DIOVAN) 320 MG tablet Take 320 mg by mouth once daily.    valsartan-hydrochlorothiazide (DIOVAN-HCT) 320-12.5 mg per tablet Take 1 tablet by mouth once daily.       Physical Exam:  General appearance: alert, appears stated age, nonverbal   Head: Normocephalic, without obvious abnormality, atraumatic  Neck: no adenopathy, no carotid bruit, no JVD, supple  Lungs: wheezing to auscultation throughout   Chest wall: no tenderness  Heart: regular rate and rhythm, S1, S2 normal, loud murmur throughout chest.   Abdomen: soft, non-tender; bowel sounds normal; no masses,  no organomegaly  Extremities: extremities normal, atraumatic, no cyanosis or edema  Pulses: DP/PT 2+ and symmetric  Skin: Skin color, texture, turgor normal. No rashes or lesions  Neurologic: nonverbal patient with hx of Cerebral palsy     Laboratory:  Chemistry:   Lab Results   Component Value Date     05/10/2017    K 4.2 05/10/2017     05/10/2017    CO2 27 05/10/2017    BUN 21 05/10/2017    CREATININE 0.9 05/10/2017    CALCIUM 10.0 05/10/2017     Cardiac Markers:   Lab Results   Component Value Date    CKMB Unable to Calculate 03/18/2013    TROPONINI 0.038 (H) 05/10/2017    TROPONINI <0.04 03/18/2013     Cardiac Markers (Last 3):   Lab Results   Component Value Date    CKMB Unable to Calculate 03/18/2013    CKMB 0.8 03/18/2013    CKMB Unable to Calculate 03/17/2013    TROPONINI 0.038 (H) 05/10/2017    TROPONINI 0.026 02/01/2016    TROPONINI <0.006 11/13/2015    TROPONINI <0.04 03/18/2013    TROPONINI <0.04 03/18/2013    TROPONINI <0.04 03/17/2013     CBC:   Lab  Results   Component Value Date    WBC 6.02 05/10/2017    HGB 10.9 (L) 05/10/2017    HCT 33.0 (L) 05/10/2017    MCV 91 05/10/2017     (L) 05/10/2017     Lipids:   Lab Results   Component Value Date    CHOL 124 11/13/2015    TRIG 53 11/13/2015    HDL 51 11/13/2015     Coagulation:   Lab Results   Component Value Date    INR 1.1 02/01/2016    APTT 25.9 11/13/2015       Diagnostic Results:  ECG: Reviewed  X-Ray: Reviewed  Echo: Reviewed      ASSESSMENT/PLAN:   Patient presented to the ED with asthmas exacerbation. She has been treated with nebulizer and steroids in the ED. Recommend checking ABG and continue nebs and steroids. Mild troponin leak likely secondary to hypoxia. Trend cardiac enzymes. Repeat 2D echo. Don't recommend Heparin gtt at this time. Will request records from Cardiologist,  Dr. Blue in [place]. Group home staff to provide medical notes as well.     Chart reviewed. Patient examined by Dr. Tena and agrees with plan that has been outlined.

## 2017-05-10 NOTE — H&P
[facility] Hospital Medicine  History & Physical    Patient Name: Ghada Beaulieu  MRN: [id number]  Admission Date: 5/10/2017  Attending Physician: No att. providers found   Primary Care Provider: Stacie Ramachandran MD         Patient information was obtained from patient and ER records.     Subjective:     Principal Problem:<principal problem not specified>    Chief Complaint:   Chief Complaint   Patient presents with    Shortness of Breath     Patient c/o SOB and wheezing for the last 2 days        HPI: Ghada Beaulieu is a 66 y.o. female patient with a PMH of asthma, cerebral palsy, blindness,  congenital heart disease, Hep B and HTN who presents to the ER with c/o wheezing that began 2 days ago. The patient is non-verbal therefore HPI is limited. The patient lives in a group home and was taken to her PCP by one of her caretakers for wheezing with a Hx of asthma, and was subsequently referred to the ER. In the ER the patient was found to have mildly elevated troponin. The case was discussed with Cardiology who recommended Observation and trending troponins.     Past Medical History:   Diagnosis Date    Asthma     Blindness     Bradycardia     Cardiomegaly     Cataract     Cerebral atrophy     Cerebral palsy     Congenital heart disease     Fibrocystic breast     Heart block     Hepatitis B carrier     HTN (hypertension)     Mycotic toenails     Onychomycosis        History reviewed. No pertinent surgical history.    Review of patient's allergies indicates:   Allergen Reactions    Bactrim [sulfamethoxazole-trimethoprim]        No current facility-administered medications on file prior to encounter.      Current Outpatient Prescriptions on File Prior to Encounter   Medication Sig    albuterol (PROVENTIL) 2.5 mg /3 mL (0.083 %) nebulizer solution Take 2.5 mg by nebulization every 6 (six) hours as needed.    allopurinol (ZYLOPRIM) 100 MG tablet Take 100 mg by mouth once daily.      amlodipine (NORVASC) 5 MG tablet Take 10 mg by mouth once daily.     aspirin (ECOTRIN) 81 MG EC tablet Take 81 mg by mouth once daily.    atorvastatin (LIPITOR) 10 MG tablet Take 10 mg by mouth once daily.    carvedilol (COREG) 25 MG tablet Take 25 mg by mouth 2 (two) times daily with meals.    gabapentin (NEURONTIN) 300 MG capsule Take 300 mg by mouth 3 (three) times daily.    glipiZIDE (GLUCOTROL) 5 MG tablet Take 5 mg by mouth daily with breakfast.     guaifenesin 100 mg/5 ml (ROBITUSSIN) 100 mg/5 mL syrup Take 200 mg by mouth 3 (three) times daily as needed.    hydrALAZINE (APRESOLINE) 25 MG tablet Take 25 mg by mouth 2 (two) times daily.    hydrochlorothiazide (MICROZIDE) 12.5 mg capsule Take 12.5 mg by mouth once daily.    ipratropium (ATROVENT) 0.02 % nebulizer solution Take 500 mcg by nebulization 4 (four) times daily.    IPRATROPIUM/ALBUTEROL SULFATE (COMBIVENT INHL) Inhale 1 puff into the lungs 2 (two) times daily.     metformin (GLUMETZA) 500 MG (MOD) 24 hr tablet Take 500 mg by mouth 2 (two) times daily with meals.     montelukast (SINGULAIR) 10 mg tablet Take 10 mg by mouth every evening.    naftifine (NAFTIN) 1 % cream Apply topically 2 (two) times daily. Aplly small amount twice daily between toes.    risperidone (RISPERDAL) 1 MG tablet Take 1 tablet (1 mg total) by mouth 2 (two) times daily.    testosterone (ANDROGEL) 1 % (50 mg/5 gram) GlPk Apply topically 2 (two) times daily.    tolterodine (DETROL) 2 MG Tab Take 1 mg by mouth 2 (two) times daily.    valsartan (DIOVAN) 320 MG tablet Take 320 mg by mouth once daily.    valsartan-hydrochlorothiazide (DIOVAN-HCT) 320-12.5 mg per tablet Take 1 tablet by mouth once daily.     Family History     Family history is unknown by patient.        Social History Main Topics    Smoking status: Never Smoker    Smokeless tobacco: Not on file    Alcohol use No    Drug use: No    Sexual activity: No     Review of Systems   Unable to perform  ROS: Patient nonverbal   Respiratory: Positive for wheezing.      Objective:     Vital Signs (Most Recent):  Temp: 98.4 °F (36.9 °C) (05/10/17 1308)  Pulse: 101 (05/10/17 1647)  Resp: (!) 22 (05/10/17 1647)  BP: (!) 157/63 (05/10/17 1647)  SpO2: 95 % (05/10/17 1647) Vital Signs (24h Range):  Temp:  [98.4 °F (36.9 °C)] 98.4 °F (36.9 °C)  Pulse:  [] 101  Resp:  [20-22] 22  SpO2:  [94 %-100 %] 95 %  BP: (146-167)/(63-79) 157/63     Weight: 39.5 kg (87 lb)  Body mass index is 19.51 kg/(m^2).    Physical Exam   Constitutional: She appears well-developed and well-nourished.   HENT:   Head: Normocephalic and atraumatic.   Eyes: Conjunctivae and EOM are normal. Pupils are equal, round, and reactive to light.   Neck: Normal range of motion. Neck supple.   Cardiovascular: Normal rate, regular rhythm, normal heart sounds and intact distal pulses.    No murmur heard.  Pulmonary/Chest: Effort normal. No respiratory distress. She has wheezes.   Expiratory wheezing noted   Abdominal: Soft. Bowel sounds are normal. She exhibits no distension. There is no tenderness.   Musculoskeletal: Normal range of motion. She exhibits no edema, tenderness or deformity.   Neurological: She is alert.   Non-verbal   Skin: Skin is warm and dry. No erythema.   Psychiatric: She has a normal mood and affect. Her behavior is normal.   Nursing note and vitals reviewed.       Significant Labs: All pertinent labs within the past 24 hours have been reviewed.    Significant Imaging:   Imaging Results         X-Ray Chest AP Portable (Final result) Result time:  05/10/17 14:16:31    Final result by Ash Watts MD (05/10/17 14:16:31)    Impression:     Cardiomegaly..      Electronically signed by: ASH WATTS MD  Date:     05/10/17  Time:    14:16     Narrative:    History: Chest pain    Cardiomegaly which appears more pronounced compared to 2/1/16. No infiltrates.                 Assessment/Plan:     SOB (shortness of breath)  - supplemental  O2  - neb tx  - pulse ox       Asthma  - supplemental O2  - neb tx  - pulse ox   - IV steroids      Anginal equivalent  - Admit to observation   - Cardiology consulted   - trend troponin   - resume ASA, statin, BB      Benign essential HTN  - monitor VS   - resume home meds      VTE Risk Mitigation         Ordered     Medium Risk of VTE  Once      05/10/17 1722     enoxaparin injection 40 mg  Daily     Route:  Subcutaneous        05/10/17 1549     Place sequential compression device  Until discontinued      05/10/17 1549        Jovanny Dempsey NP  Department of Hospital Medicine   [facility]

## 2017-05-10 NOTE — IP AVS SNAPSHOT
[address] [place] [address]           Patient Discharge Instructions   Our goal is to set you up for success. This packet includes information on your condition, medications, and your home care.  It will help you care for yourself to prevent having to return to the hospital.     Please ask your nurse if you have any questions.      There are many details to remember when preparing to leave the hospital. Here is what you will need to do:    1. Take your medicine. If you are prescribed medications, review your Medication List on the following pages. You may have new medications to  at the pharmacy and others that you'll need to stop taking. Review the instructions for how and when to take your medications. Talk with your doctor or nurses if you are unsure of what to do.     2. Go to your follow-up appointments. Specific follow-up information is listed in the following pages. Your may be contacted by a nurse or clinical provider about future appointments. Be sure we have all of the phone numbers to reach you. Please contact your provider's office if you are unable to make an appointment.     3. Watch for warning signs. Your doctor or nurse will give you detailed warning signs to watch for and when to call for assistance. These instructions may also include educational information about your condition. If you experience any of warning signs to your health, call your doctor.               ** Verify the list of medication(s) below is accurate and up to date. Carry this with you in case of emergency. If your medications have changed, please notify your healthcare provider.             Medication List      START taking these medications        Additional Info                      predniSONE 20 MG tablet   Commonly known as:  DELTASONE   Quantity:  5 tablet   Refills:  0   Dose:  20 mg    Instructions:  Take 1 tablet (20 mg total) by mouth once daily.     Begin Date     AM    Noon    PM    Bedtime         CONTINUE taking these medications        Additional Info                      albuterol 2.5 mg /3 mL (0.083 %) nebulizer solution   Commonly known as:  PROVENTIL   Refills:  0   Dose:  2.5 mg    Instructions:  Take 2.5 mg by nebulization every 6 (six) hours as needed.     Begin Date    AM    Noon    PM    Bedtime       allopurinol 100 MG tablet   Commonly known as:  ZYLOPRIM   Refills:  0   Dose:  100 mg    Last time this was given:  100 mg on 5/12/2017  9:00 AM   Instructions:  Take 100 mg by mouth once daily.     Begin Date    AM    Noon    PM    Bedtime       amlodipine 5 MG tablet   Commonly known as:  NORVASC   Refills:  0   Dose:  10 mg    Last time this was given:  10 mg on 5/12/2017  9:01 AM   Instructions:  Take 10 mg by mouth once daily.     Begin Date    AM    Noon    PM    Bedtime       aspirin 81 MG EC tablet   Commonly known as:  ECOTRIN   Refills:  0   Dose:  81 mg    Last time this was given:  81 mg on 5/12/2017  9:00 AM   Instructions:  Take 81 mg by mouth once daily.     Begin Date    AM    Noon    PM    Bedtime       atorvastatin 10 MG tablet   Commonly known as:  LIPITOR   Refills:  0   Dose:  10 mg    Last time this was given:  10 mg on 5/12/2017  9:00 AM   Instructions:  Take 10 mg by mouth once daily.     Begin Date    AM    Noon    PM    Bedtime       carvedilol 25 MG tablet   Commonly known as:  COREG   Refills:  0   Dose:  25 mg    Last time this was given:  25 mg on 5/12/2017  8:45 AM   Instructions:  Take 25 mg by mouth 2 (two) times daily with meals.     Begin Date    AM    Noon    PM    Bedtime       COMBIVENT INHL   Refills:  0   Dose:  1 puff    Instructions:  Inhale 1 puff into the lungs 2 (two) times daily.     Begin Date    AM    Noon    PM    Bedtime       gabapentin 300 MG capsule   Commonly known as:  NEURONTIN   Refills:  0   Dose:  300 mg    Last time this was given:  300 mg on 5/12/2017  2:27 PM   Instructions:  Take 300 mg by mouth 3  (three) times daily.     Begin Date    AM    Noon    PM    Bedtime       glipiZIDE 5 MG tablet   Commonly known as:  GLUCOTROL   Refills:  0   Dose:  5 mg    Instructions:  Take 5 mg by mouth daily with breakfast.     Begin Date    AM    Noon    PM    Bedtime       guaifenesin 100 mg/5 ml 100 mg/5 mL syrup   Commonly known as:  ROBITUSSIN   Refills:  0   Dose:  200 mg    Instructions:  Take 200 mg by mouth 3 (three) times daily as needed.     Begin Date    AM    Noon    PM    Bedtime       hydrALAZINE 25 MG tablet   Commonly known as:  APRESOLINE   Refills:  0   Dose:  25 mg    Last time this was given:  25 mg on 5/12/2017  9:00 AM   Instructions:  Take 25 mg by mouth 2 (two) times daily.     Begin Date    AM    Noon    PM    Bedtime       ipratropium 0.02 % nebulizer solution   Commonly known as:  ATROVENT   Refills:  0   Dose:  500 mcg    Instructions:  Take 500 mcg by nebulization 4 (four) times daily.     Begin Date    AM    Noon    PM    Bedtime       metformin 500 MG (MOD) 24 hr tablet   Commonly known as:  GLUMETZA   Refills:  0   Dose:  500 mg    Instructions:  Take 500 mg by mouth 2 (two) times daily with meals.     Begin Date    AM    Noon    PM    Bedtime       montelukast 10 mg tablet   Commonly known as:  SINGULAIR   Refills:  0   Dose:  10 mg    Last time this was given:  10 mg on 5/11/2017  9:51 PM   Instructions:  Take 10 mg by mouth every evening.     Begin Date    AM    Noon    PM    Bedtime       naftifine 1 % cream   Commonly known as:  NAFTIN   Refills:  0    Instructions:  Apply topically 2 (two) times daily. Aplly small amount twice daily between toes.     Begin Date    AM    Noon    PM    Bedtime       risperidone 1 MG tablet   Commonly known as:  RISPERDAL   Quantity:  60 tablet   Refills:  0   Dose:  1 mg    Last time this was given:  1 mg on 5/12/2017  9:00 AM   Instructions:  Take 1 tablet (1 mg total) by mouth 2 (two) times daily.     Begin Date    AM    Noon    PM    Bedtime        testosterone 1 % (50 mg/5 gram) Glpk   Commonly known as:  ANDROGEL   Refills:  0    Instructions:  Apply topically 2 (two) times daily.     Begin Date    AM    Noon    PM    Bedtime       tolterodine 2 MG Tab   Commonly known as:  DETROL   Refills:  0   Dose:  1 mg    Instructions:  Take 1 mg by mouth 2 (two) times daily.     Begin Date    AM    Noon    PM    Bedtime       valsartan-hydrochlorothiazide 320-12.5 mg per tablet   Commonly known as:  DIOVAN-HCT   Refills:  0   Dose:  1 tablet    Instructions:  Take 1 tablet by mouth once daily.     Begin Date    AM    Noon    PM    Bedtime         STOP taking these medications     valsartan 320 MG tablet   Commonly known as:  DIOVAN            Where to Get Your Medications      You can get these medications from any pharmacy     Bring a paper prescription for each of these medications     predniSONE 20 MG tablet                  Please bring to all follow up appointments:    1. A copy of your discharge instructions.  2. All medicines you are currently taking in their original bottles.  3. Identification and insurance card.    Please arrive 15 minutes ahead of scheduled appointment time.    Please call 24 hours in advance if you must reschedule your appointment and/or time.        Follow-up Information     Follow up with Stacie Ramachandran MD In 3 days.    Specialty:  Internal Medicine    Why:  Please call to schedule an appointment    Contact information:    [address] [place] [id number] [phone]          Discharge Instructions     Future Orders    Activity as tolerated     Call MD for:  difficulty breathing or increased cough     Call MD for:  increased confusion or weakness     Call MD for:  persistent dizziness, light-headedness, or visual disturbances     Call MD for:  persistent nausea and vomiting or diarrhea     Call MD for:  temperature >100.4     Diet general     Questions:    Total calories:      Fat restriction, if any:      Protein  "restriction, if any:      Na restriction, if any:      Fluid restriction:      Additional restrictions:      Diet general     Questions:    Total calories:      Fat restriction, if any:      Protein restriction, if any:      Na restriction, if any:      Fluid restriction:      Additional restrictions:          Primary Diagnosis     Your primary diagnosis was:  Asthma      Admission Information     Date & Time Provider Department CSN    5/10/2017  1:11 PM Keesha Renner MD [facility] -  [id number]      Care Providers     Provider Role Specialty Primary office phone    Keesha Renner MD Attending Provider Family Medicine [phone]    Keesha Renner MD Team Attending  Family Medicine [phone]      Your Vitals Were     BP Pulse Temp Resp    123/53 (BP Location: Left arm, Patient Position: Lying, BP Method: Automatic) 90 97.7 °F (36.5 °C) (Axillary) 20    Height Weight SpO2 BMI    4' 8" (1.422 m) 37.6 kg (82 lb 14.4 oz) 98% 18.59 kg/m2      Recent Lab Values     No lab values to display.      Pending Labs     Order Current Status    Hemoglobin A1c In process      Allergies as of 5/12/2017        Reactions    Bactrim [Sulfamethoxazole-trimethoprim]       [facility]     [facility] Nurse Care Line - 24/7 Assistance  Unless otherwise directed by your provider, please contact [facility], our nurse care line that is available for 24/7 assistance.     Registered nurses in the [facility] provide clinical advisement, health education, appointment booking, and other advisory services.  Call for this free service at [phone].        Advance Directives     An advance directive is a document which, in the event you are no longer able to make decisions for yourself, tells your healthcare team what kind of treatment you do or do not want to receive, or who you would like to make those decisions for you.  If you do not currently have an advance directive, [facility] encourages you " to create one.  For more information call:  [phone] ([phone]),  or log on to [email or web address].        Language Assistance Services     ATTENTION: Language assistance services are available, free of charge. Please call [phone].      ATENCIÓN: Si habla español, tiene a cohen disposición servicios gratuitos de asistencia lingüística. Llame al [phone].     [facility] Ý: N?u b?n nói Ti?ng Vi?t, có các d?ch v? h? tr? ngôn ng? mi?n phí dành cho b?n. G?i s? [phone].        Stroke Education               [facility] -  complies with applicable Federal civil rights laws and does not discriminate on the basis of race, color, national origin, age, disability, or sex.

## 2017-05-10 NOTE — SUBJECTIVE & OBJECTIVE
Past Medical History:   Diagnosis Date    Asthma     Blindness     Bradycardia     Cardiomegaly     Cataract     Cerebral atrophy     Cerebral palsy     Congenital heart disease     Fibrocystic breast     Heart block     Hepatitis B carrier     HTN (hypertension)     Mycotic toenails     Onychomycosis        History reviewed. No pertinent surgical history.    Review of patient's allergies indicates:   Allergen Reactions    Bactrim [sulfamethoxazole-trimethoprim]        No current facility-administered medications on file prior to encounter.      Current Outpatient Prescriptions on File Prior to Encounter   Medication Sig    albuterol (PROVENTIL) 2.5 mg /3 mL (0.083 %) nebulizer solution Take 2.5 mg by nebulization every 6 (six) hours as needed.    allopurinol (ZYLOPRIM) 100 MG tablet Take 100 mg by mouth once daily.     amlodipine (NORVASC) 5 MG tablet Take 10 mg by mouth once daily.     aspirin (ECOTRIN) 81 MG EC tablet Take 81 mg by mouth once daily.    atorvastatin (LIPITOR) 10 MG tablet Take 10 mg by mouth once daily.    carvedilol (COREG) 25 MG tablet Take 25 mg by mouth 2 (two) times daily with meals.    gabapentin (NEURONTIN) 300 MG capsule Take 300 mg by mouth 3 (three) times daily.    glipiZIDE (GLUCOTROL) 5 MG tablet Take 5 mg by mouth daily with breakfast.     guaifenesin 100 mg/5 ml (ROBITUSSIN) 100 mg/5 mL syrup Take 200 mg by mouth 3 (three) times daily as needed.    hydrALAZINE (APRESOLINE) 25 MG tablet Take 25 mg by mouth 2 (two) times daily.    hydrochlorothiazide (MICROZIDE) 12.5 mg capsule Take 12.5 mg by mouth once daily.    ipratropium (ATROVENT) 0.02 % nebulizer solution Take 500 mcg by nebulization 4 (four) times daily.    IPRATROPIUM/ALBUTEROL SULFATE (COMBIVENT INHL) Inhale 1 puff into the lungs 2 (two) times daily.     metformin (GLUMETZA) 500 MG (MOD) 24 hr tablet Take 500 mg by mouth 2 (two) times daily with meals.     montelukast (SINGULAIR) 10 mg tablet  Take 10 mg by mouth every evening.    naftifine (NAFTIN) 1 % cream Apply topically 2 (two) times daily. Aplly small amount twice daily between toes.    risperidone (RISPERDAL) 1 MG tablet Take 1 tablet (1 mg total) by mouth 2 (two) times daily.    testosterone (ANDROGEL) 1 % (50 mg/5 gram) GlPk Apply topically 2 (two) times daily.    tolterodine (DETROL) 2 MG Tab Take 1 mg by mouth 2 (two) times daily.    valsartan (DIOVAN) 320 MG tablet Take 320 mg by mouth once daily.    valsartan-hydrochlorothiazide (DIOVAN-HCT) 320-12.5 mg per tablet Take 1 tablet by mouth once daily.     Family History     Family history is unknown by patient.        Social History Main Topics    Smoking status: Never Smoker    Smokeless tobacco: Not on file    Alcohol use No    Drug use: No    Sexual activity: No     Review of Systems   Unable to perform ROS: Patient nonverbal   Respiratory: Positive for wheezing.      Objective:     Vital Signs (Most Recent):  Temp: 98.4 °F (36.9 °C) (05/10/17 1308)  Pulse: 101 (05/10/17 1647)  Resp: (!) 22 (05/10/17 1647)  BP: (!) 157/63 (05/10/17 1647)  SpO2: 95 % (05/10/17 1647) Vital Signs (24h Range):  Temp:  [98.4 °F (36.9 °C)] 98.4 °F (36.9 °C)  Pulse:  [] 101  Resp:  [20-22] 22  SpO2:  [94 %-100 %] 95 %  BP: (146-167)/(63-79) 157/63     Weight: 39.5 kg (87 lb)  Body mass index is 19.51 kg/(m^2).    Physical Exam   Constitutional: She appears well-developed and well-nourished.   HENT:   Head: Normocephalic and atraumatic.   Eyes: Conjunctivae and EOM are normal. Pupils are equal, round, and reactive to light.   Neck: Normal range of motion. Neck supple.   Cardiovascular: Normal rate, regular rhythm, normal heart sounds and intact distal pulses.    No murmur heard.  Pulmonary/Chest: Effort normal. No respiratory distress. She has wheezes.   Expiratory wheezing noted   Abdominal: Soft. Bowel sounds are normal. She exhibits no distension. There is no tenderness.   Musculoskeletal: Normal  range of motion. She exhibits no edema, tenderness or deformity.   Neurological: She is alert.   Non-verbal   Skin: Skin is warm and dry. No erythema.   Psychiatric: She has a normal mood and affect. Her behavior is normal.   Nursing note and vitals reviewed.       Significant Labs: All pertinent labs within the past 24 hours have been reviewed.    Significant Imaging:   Imaging Results         X-Ray Chest AP Portable (Final result) Result time:  05/10/17 14:16:31    Final result by Ash Watts MD (05/10/17 14:16:31)    Impression:     Cardiomegaly..      Electronically signed by: ASH WATTS MD  Date:     05/10/17  Time:    14:16     Narrative:    History: Chest pain    Cardiomegaly which appears more pronounced compared to 2/1/16. No infiltrates.

## 2017-05-11 LAB
ALBUMIN SERPL BCP-MCNC: 2.9 G/DL
ALP SERPL-CCNC: 58 U/L
ALT SERPL W/O P-5'-P-CCNC: 33 U/L
ANION GAP SERPL CALC-SCNC: 9 MMOL/L
AST SERPL-CCNC: 23 U/L
BASOPHILS # BLD AUTO: 0 K/UL
BASOPHILS NFR BLD: 0 %
BILIRUB SERPL-MCNC: 0.7 MG/DL
BUN SERPL-MCNC: 27 MG/DL
CALCIUM SERPL-MCNC: 9.1 MG/DL
CHLORIDE SERPL-SCNC: 104 MMOL/L
CO2 SERPL-SCNC: 23 MMOL/L
CREAT SERPL-MCNC: 1 MG/DL
DIFFERENTIAL METHOD: ABNORMAL
EOSINOPHIL # BLD AUTO: 0 K/UL
EOSINOPHIL NFR BLD: 0 %
ERYTHROCYTE [DISTWIDTH] IN BLOOD BY AUTOMATED COUNT: 15 %
EST. GFR  (AFRICAN AMERICAN): >60 ML/MIN/1.73 M^2
EST. GFR  (NON AFRICAN AMERICAN): 60 ML/MIN/1.73 M^2
GLUCOSE SERPL-MCNC: 292 MG/DL
HCT VFR BLD AUTO: 31.7 %
HGB BLD-MCNC: 10.6 G/DL
LYMPHOCYTES # BLD AUTO: 0.5 K/UL
LYMPHOCYTES NFR BLD: 14.2 %
MAGNESIUM SERPL-MCNC: 1.8 MG/DL
MCH RBC QN AUTO: 29.8 PG
MCHC RBC AUTO-ENTMCNC: 33.4 %
MCV RBC AUTO: 89 FL
MONOCYTES # BLD AUTO: 0.1 K/UL
MONOCYTES NFR BLD: 2.1 %
NEUTROPHILS # BLD AUTO: 3.1 K/UL
NEUTROPHILS NFR BLD: 83.7 %
PHOSPHATE SERPL-MCNC: 4.3 MG/DL
PLATELET # BLD AUTO: 132 K/UL
PMV BLD AUTO: 10.9 FL
POCT GLUCOSE: 274 MG/DL (ref 70–110)
POCT GLUCOSE: 300 MG/DL (ref 70–110)
POCT GLUCOSE: 350 MG/DL (ref 70–110)
POTASSIUM SERPL-SCNC: 4 MMOL/L
PROT SERPL-MCNC: 6.4 G/DL
RBC # BLD AUTO: 3.56 M/UL
SODIUM SERPL-SCNC: 136 MMOL/L
TROPONIN I SERPL DL<=0.01 NG/ML-MCNC: 0.02 NG/ML
WBC # BLD AUTO: 3.74 K/UL

## 2017-05-11 PROCEDURE — 63600175 PHARM REV CODE 636 W HCPCS: Performed by: FAMILY MEDICINE

## 2017-05-11 PROCEDURE — 80053 COMPREHEN METABOLIC PANEL: CPT

## 2017-05-11 PROCEDURE — 25000003 PHARM REV CODE 250: Performed by: EMERGENCY MEDICINE

## 2017-05-11 PROCEDURE — 36415 COLL VENOUS BLD VENIPUNCTURE: CPT

## 2017-05-11 PROCEDURE — 83735 ASSAY OF MAGNESIUM: CPT

## 2017-05-11 PROCEDURE — 21400001 HC TELEMETRY ROOM

## 2017-05-11 PROCEDURE — 84100 ASSAY OF PHOSPHORUS: CPT

## 2017-05-11 PROCEDURE — 94640 AIRWAY INHALATION TREATMENT: CPT

## 2017-05-11 PROCEDURE — 11000001 HC ACUTE MED/SURG PRIVATE ROOM

## 2017-05-11 PROCEDURE — 25000242 PHARM REV CODE 250 ALT 637 W/ HCPCS: Performed by: NURSE PRACTITIONER

## 2017-05-11 PROCEDURE — 84484 ASSAY OF TROPONIN QUANT: CPT

## 2017-05-11 PROCEDURE — 85025 COMPLETE CBC W/AUTO DIFF WBC: CPT

## 2017-05-11 PROCEDURE — 63600175 PHARM REV CODE 636 W HCPCS: Performed by: EMERGENCY MEDICINE

## 2017-05-11 PROCEDURE — 25000003 PHARM REV CODE 250: Performed by: NURSE PRACTITIONER

## 2017-05-11 RX ORDER — INSULIN ASPART 100 [IU]/ML
1-10 INJECTION, SOLUTION INTRAVENOUS; SUBCUTANEOUS
Status: DISCONTINUED | OUTPATIENT
Start: 2017-05-11 | End: 2017-05-12 | Stop reason: HOSPADM

## 2017-05-11 RX ADMIN — ENOXAPARIN SODIUM 40 MG: 100 INJECTION SUBCUTANEOUS at 06:05

## 2017-05-11 RX ADMIN — IPRATROPIUM BROMIDE AND ALBUTEROL SULFATE 3 ML: .5; 3 SOLUTION RESPIRATORY (INHALATION) at 08:05

## 2017-05-11 RX ADMIN — INSULIN ASPART 3 UNITS: 100 INJECTION, SOLUTION INTRAVENOUS; SUBCUTANEOUS at 12:05

## 2017-05-11 RX ADMIN — HYDRALAZINE HYDROCHLORIDE 25 MG: 25 TABLET, FILM COATED ORAL at 08:05

## 2017-05-11 RX ADMIN — INSULIN ASPART 8 UNITS: 100 INJECTION, SOLUTION INTRAVENOUS; SUBCUTANEOUS at 06:05

## 2017-05-11 RX ADMIN — FAMOTIDINE 20 MG: 20 TABLET ORAL at 08:05

## 2017-05-11 RX ADMIN — METHYLPREDNISOLONE SODIUM SUCCINATE 40 MG: 125 INJECTION, POWDER, FOR SOLUTION INTRAMUSCULAR; INTRAVENOUS at 11:05

## 2017-05-11 RX ADMIN — MONTELUKAST SODIUM 10 MG: 10 TABLET, FILM COATED ORAL at 09:05

## 2017-05-11 RX ADMIN — ATORVASTATIN CALCIUM 10 MG: 10 TABLET, FILM COATED ORAL at 08:05

## 2017-05-11 RX ADMIN — GABAPENTIN 300 MG: 300 CAPSULE ORAL at 06:05

## 2017-05-11 RX ADMIN — ALLOPURINOL 100 MG: 100 TABLET ORAL at 08:05

## 2017-05-11 RX ADMIN — GABAPENTIN 300 MG: 300 CAPSULE ORAL at 03:05

## 2017-05-11 RX ADMIN — METHYLPREDNISOLONE SODIUM SUCCINATE 40 MG: 125 INJECTION, POWDER, FOR SOLUTION INTRAMUSCULAR; INTRAVENOUS at 12:05

## 2017-05-11 RX ADMIN — VALSARTAN 320 MG: 80 TABLET, FILM COATED ORAL at 08:05

## 2017-05-11 RX ADMIN — STANDARDIZED SENNA CONCENTRATE AND DOCUSATE SODIUM 1 TABLET: 8.6; 5 TABLET, FILM COATED ORAL at 09:05

## 2017-05-11 RX ADMIN — IPRATROPIUM BROMIDE AND ALBUTEROL SULFATE 3 ML: .5; 3 SOLUTION RESPIRATORY (INHALATION) at 07:05

## 2017-05-11 RX ADMIN — CARVEDILOL 25 MG: 12.5 TABLET, FILM COATED ORAL at 08:05

## 2017-05-11 RX ADMIN — GABAPENTIN 300 MG: 300 CAPSULE ORAL at 09:05

## 2017-05-11 RX ADMIN — IPRATROPIUM BROMIDE AND ALBUTEROL SULFATE 3 ML: .5; 3 SOLUTION RESPIRATORY (INHALATION) at 01:05

## 2017-05-11 RX ADMIN — AMLODIPINE BESYLATE 10 MG: 10 TABLET ORAL at 08:05

## 2017-05-11 RX ADMIN — HYDRALAZINE HYDROCHLORIDE 25 MG: 25 TABLET, FILM COATED ORAL at 09:05

## 2017-05-11 RX ADMIN — RISPERIDONE 1 MG: 1 TABLET, FILM COATED ORAL at 09:05

## 2017-05-11 RX ADMIN — HYDROCHLOROTHIAZIDE 12.5 MG: 12.5 TABLET ORAL at 08:05

## 2017-05-11 RX ADMIN — METHYLPREDNISOLONE SODIUM SUCCINATE 40 MG: 125 INJECTION, POWDER, FOR SOLUTION INTRAMUSCULAR; INTRAVENOUS at 05:05

## 2017-05-11 RX ADMIN — INSULIN ASPART 3 UNITS: 100 INJECTION, SOLUTION INTRAVENOUS; SUBCUTANEOUS at 05:05

## 2017-05-11 RX ADMIN — STANDARDIZED SENNA CONCENTRATE AND DOCUSATE SODIUM 1 TABLET: 8.6; 5 TABLET, FILM COATED ORAL at 08:05

## 2017-05-11 RX ADMIN — METHYLPREDNISOLONE SODIUM SUCCINATE 40 MG: 125 INJECTION, POWDER, FOR SOLUTION INTRAMUSCULAR; INTRAVENOUS at 06:05

## 2017-05-11 RX ADMIN — RISPERIDONE 1 MG: 1 TABLET, FILM COATED ORAL at 08:05

## 2017-05-11 RX ADMIN — CARVEDILOL 25 MG: 12.5 TABLET, FILM COATED ORAL at 06:05

## 2017-05-11 RX ADMIN — ASPIRIN 81 MG: 81 TABLET, COATED ORAL at 09:05

## 2017-05-11 NOTE — PROGRESS NOTES
Patient oriented to room, call light, fall precautions, and room service. VS and assessment completed. Patient's care giver from group home at beside and able to help complete admit assessment since patient non verbal.

## 2017-05-11 NOTE — PLAN OF CARE
Problem: Patient Care Overview  Goal: Plan of Care Review  Outcome: Ongoing (interventions implemented as appropriate)  Free of falls/injury during shift  No noted s/s of pain/discomfort  Pt blind and unable to communicate effectively  Serial troponin trending down  IV steroids admin  BG monitored and managed  NSR on telemetry  Safety interventions in place

## 2017-05-11 NOTE — ASSESSMENT & PLAN NOTE
- supplemental O2  - neb tx  - pulse ox   - IV steroids    5/11/17:Pt is still with a significant amount of wheezing  -continue with current management

## 2017-05-11 NOTE — SUBJECTIVE & OBJECTIVE
Interval History: 65 yo female with CP/MR admitted for acute asthma exacerbation. She still has a significant amount of wheezing. History taken from caretakers as she is mostly nonverbal.    Review of Systems   Respiratory: Positive for wheezing.    All other systems reviewed and are negative.    Objective:     Vital Signs (Most Recent):  Temp: 97.6 °F (36.4 °C) (05/11/17 1137)  Pulse: 81 (05/11/17 1137)  Resp: 18 (05/11/17 1137)  BP: 129/70 (05/11/17 1137)  SpO2: 99 % (05/11/17 1137) Vital Signs (24h Range):  Temp:  [97.5 °F (36.4 °C)-98.4 °F (36.9 °C)] 97.6 °F (36.4 °C)  Pulse:  [] 81  Resp:  [18-22] 18  SpO2:  [94 %-100 %] 99 %  BP: (103-167)/(54-87) 129/70     Weight: 37.6 kg (82 lb 14.4 oz)  Body mass index is 18.59 kg/(m^2).    Intake/Output Summary (Last 24 hours) at 05/11/17 1235  Last data filed at 05/11/17 0800   Gross per 24 hour   Intake              240 ml   Output                0 ml   Net              240 ml      Physical Exam   Constitutional:   Thin elderly female   HENT:   Head: Normocephalic and atraumatic.   Nose: Nose normal.   Mouth/Throat: Oropharynx is clear and moist.   Eyes: Conjunctivae are normal. Pupils are equal, round, and reactive to light.   Neck: Normal range of motion. Neck supple.   Cardiovascular: Normal rate, regular rhythm and intact distal pulses.    Murmur heard.  Pulmonary/Chest: She has wheezes.   Abdominal: Soft. Bowel sounds are normal.   Neurological: She is alert.   Skin: Skin is warm.   Nursing note and vitals reviewed.      Significant Labs:   Recent Lab Results       05/11/17  1136 05/11/17  0512 05/11/17  0230 05/10/17  2132 05/10/17  1958      Albumin   2.9(L)       Alkaline Phosphatase   58       ALT   33       Anion Gap   9       AST   23       Baso #   0.00       Basophil%   0.0       Total Bilirubin   0.7  Comment:  For infants and newborns, interpretation of results should be based  on gestational age, weight and in agreement with  clinical  observations.  Premature Infant recommended reference ranges:  Up to 24 hours.............<8.0 mg/dL  Up to 48 hours............<12.0 mg/dL  3-5 days..................<15.0 mg/dL  6-29 days.................<15.0 mg/dL         BUN, Bld   27(H)       Calcium   9.1       Chloride   104       CO2   23       Creatinine   1.0       Differential Method   Automated       EF          eGFR if    >60       eGFR if non    60  Comment:  Calculation used to obtain the estimated glomerular filtration  rate (eGFR) is the CKD-EPI equation. Since race is unknown   in our information system, the eGFR values for   -American and Non--American patients are given   for each creatinine result.         Eos #   0.0       Eosinophil%   0.0       Glucose   292(H)       Gran #   3.1       Gran%   83.7(H)       Hematocrit   31.7(L)       Hemoglobin   10.6(L)       Lymph #   0.5(L)       Lymph%   14.2(L)       Magnesium   1.8       MCH   29.8       MCHC   33.4       MCV   89       Mono #   0.1(L)       Mono%   2.1(L)       MPV   10.9       Est. PA Systolic Pressure          Pericardial Effusion          Phosphorus   4.3       Platelets   132(L)       POCT Glucose 300(H) 274(H)  388(H)      Potassium   4.0       Total Protein   6.4       RBC   3.56(L)       RDW   15.0(H)       Sodium   136       Troponin I   0.017  Comment:  The reference interval for Troponin I represents the 99th percentile   cutoff   for our facility and is consistent with 3rd generation assay   performance.    0.022  Comment:  The reference interval for Troponin I represents the 99th percentile   cutoff   for our facility and is consistent with 3rd generation assay   performance.       Tricuspid Valve Regurgitation          WBC   3.74(L)                   05/10/17  1606 05/10/17  1343      Albumin  3.8     Alkaline Phosphatase  66     ALT  46(H)     Anion Gap  9     AST  34     Baso #  0.02     Basophil%  0.3     Total  Bilirubin  1.3  Comment:  For infants and newborns, interpretation of results should be based  on gestational age, weight and in agreement with clinical  observations.  Premature Infant recommended reference ranges:  Up to 24 hours.............<8.0 mg/dL  Up to 48 hours............<12.0 mg/dL  3-5 days..................<15.0 mg/dL  6-29 days.................<15.0 mg/dL  (H)     BUN, Bld  21     Calcium  10.0     Chloride  105     CO2  27     Creatinine  0.9     Differential Method  Automated     EF 65      eGFR if   >60     eGFR if non   >60  Comment:  Calculation used to obtain the estimated glomerular filtration  rate (eGFR) is the CKD-EPI equation. Since race is unknown   in our information system, the eGFR values for   -American and Non--American patients are given   for each creatinine result.       Eos #  0.1     Eosinophil%  1.7     Glucose  114(H)     Gran #  3.4     Gran%  56.3     Hematocrit  33.0(L)     Hemoglobin  10.9(L)     Lymph #  1.3     Lymph%  21.3     Magnesium       MCH  30.2     MCHC  33.0     MCV  91     Mono #  1.2(H)     Mono%  20.4(H)     MPV  10.5     Est. PA Systolic Pressure 50(A)      Pericardial Effusion MODERATE(A)      Phosphorus       Platelets  134(L)     POCT Glucose       Potassium  4.2     Total Protein  7.7     RBC  3.61(L)     RDW  14.5     Sodium  141     Troponin I  0.038  Comment:  The reference interval for Troponin I represents the 99th percentile   cutoff   for our facility and is consistent with 3rd generation assay   performance.  (H)     Tricuspid Valve Regurgitation MODERATE(A)      WBC  6.02           Significant Imaging: I have reviewed and interpreted all pertinent imaging results/findings within the past 24 hours.

## 2017-05-11 NOTE — PLAN OF CARE
Problem: Patient Care Overview  Goal: Plan of Care Review  Outcome: Ongoing (interventions implemented as appropriate)  Patient has a lot wheezing atternoon but improving after breathing treatment.

## 2017-05-11 NOTE — PLAN OF CARE
CM met with patient with caretakers at the bedside.  All information obtained from caretakers due to patient's mental status.  Caretakers reports they assist with all adl's and administer all medications to the patient.  Caretakers states the group home the patient resides is not a medical home and if it deemed the patient will require accuchecks and insulin she will need to be transferred to a medical home where nurses are present 24 hours a day.  Caretaker reports the homes are all [place] homes, however each home has a different status and the home the patient lives does not have medical personnel.   Caretaker denies any needs at this time.      05/11/17 1146   Discharge Assessment   Assessment Type Discharge Planning Assessment   Confirmed/corrected address and phone number on facesheet? Yes   Assessment information obtained from? Caregiver   Expected Length of Stay (days) 1   Communicated expected length of stay with patient/caregiver yes   Prior to hospitilization cognitive status: Unable to Assess   Prior to hospitalization functional status: Needs Assistance   Current cognitive status: Unable to Assess   Current Functional Status: Needs Assistance   Arrived From group home   Lives With facility resident   Able to Return to Prior Arrangements yes   Is patient able to care for self after discharge? No   How many people do you have in your home that can help with your care after discharge? other (see comments)  (Patient lives in group home)   Patient's perception of discharge disposition group home   Readmission Within The Last 30 Days no previous admission in last 30 days   Patient currently being followed by outpatient case management? No   Patient currently receives home health services? No   Does the patient currently use HME? Yes   Patient currently receives private duty nursing? No   Patient currently receives any other outside agency services? No   Equipment Currently Used at Home wheelchair;shower  chair   Do you have any problems affording any of your prescribed medications? No   Is the patient taking medications as prescribed? yes   Do you have any financial concerns preventing you from receiving the healthcare you need? No   Does the patient have transportation to healthcare appointments? Yes   Transportation Available other (see comments)  (Group home provides transportation)   On Dialysis? No   Does the patient receive services at the [facility]? No   Are there any open cases? No   Discharge Plan A Group Home   Discharge Plan B Group home   Patient/Family In Agreement With Plan yes

## 2017-05-11 NOTE — NURSING
Patient assessed for diabetes educational needs following chart review and nurses request  She lives in a group home and info obtained from Carter, care taker, who is at her bedside  She reports is unsure of length of diabetes history, but she has been diabetic as long as she has cared for her over the past 3 years  They do not provide any blood glucose monitoring at the facility where she resides  The staff prepares her food under the direction of a dietitian guidance.  Patient is able to feed herself and they assist if needed  They administer her meds as prescribed  Reviewed info on target glucose values/hyper/hypoglycemia  If further level of care is required such as glucose monitoring she would require admission to a different level of care facility

## 2017-05-12 VITALS
WEIGHT: 82.88 LBS | DIASTOLIC BLOOD PRESSURE: 73 MMHG | RESPIRATION RATE: 18 BRPM | BODY MASS INDEX: 18.64 KG/M2 | SYSTOLIC BLOOD PRESSURE: 141 MMHG | HEIGHT: 56 IN | TEMPERATURE: 98 F | HEART RATE: 81 BPM | OXYGEN SATURATION: 95 %

## 2017-05-12 PROBLEM — E46 MALNUTRITION: Status: ACTIVE | Noted: 2017-05-12

## 2017-05-12 LAB
POCT GLUCOSE: 197 MG/DL (ref 70–110)
POCT GLUCOSE: 229 MG/DL (ref 70–110)

## 2017-05-12 PROCEDURE — 97802 MEDICAL NUTRITION INDIV IN: CPT

## 2017-05-12 PROCEDURE — 96372 THER/PROPH/DIAG INJ SC/IM: CPT

## 2017-05-12 PROCEDURE — 96374 THER/PROPH/DIAG INJ IV PUSH: CPT

## 2017-05-12 PROCEDURE — 36415 COLL VENOUS BLD VENIPUNCTURE: CPT

## 2017-05-12 PROCEDURE — 25000003 PHARM REV CODE 250: Performed by: EMERGENCY MEDICINE

## 2017-05-12 PROCEDURE — 63600175 PHARM REV CODE 636 W HCPCS: Performed by: FAMILY MEDICINE

## 2017-05-12 PROCEDURE — 82962 GLUCOSE BLOOD TEST: CPT

## 2017-05-12 PROCEDURE — 94640 AIRWAY INHALATION TREATMENT: CPT

## 2017-05-12 PROCEDURE — 83036 HEMOGLOBIN GLYCOSYLATED A1C: CPT

## 2017-05-12 PROCEDURE — 96376 TX/PRO/DX INJ SAME DRUG ADON: CPT

## 2017-05-12 PROCEDURE — 25000003 PHARM REV CODE 250: Performed by: NURSE PRACTITIONER

## 2017-05-12 PROCEDURE — 25000242 PHARM REV CODE 250 ALT 637 W/ HCPCS: Performed by: NURSE PRACTITIONER

## 2017-05-12 RX ORDER — PREDNISONE 20 MG/1
20 TABLET ORAL DAILY
Qty: 5 TABLET | Refills: 0 | Status: SHIPPED | OUTPATIENT
Start: 2017-05-12 | End: 2017-05-17

## 2017-05-12 RX ADMIN — HYDRALAZINE HYDROCHLORIDE 25 MG: 25 TABLET, FILM COATED ORAL at 09:05

## 2017-05-12 RX ADMIN — INSULIN ASPART 1 UNITS: 100 INJECTION, SOLUTION INTRAVENOUS; SUBCUTANEOUS at 12:05

## 2017-05-12 RX ADMIN — CARVEDILOL 25 MG: 12.5 TABLET, FILM COATED ORAL at 08:05

## 2017-05-12 RX ADMIN — VALSARTAN 320 MG: 80 TABLET, FILM COATED ORAL at 09:05

## 2017-05-12 RX ADMIN — ATORVASTATIN CALCIUM 10 MG: 10 TABLET, FILM COATED ORAL at 09:05

## 2017-05-12 RX ADMIN — INSULIN ASPART 4 UNITS: 100 INJECTION, SOLUTION INTRAVENOUS; SUBCUTANEOUS at 06:05

## 2017-05-12 RX ADMIN — IPRATROPIUM BROMIDE AND ALBUTEROL SULFATE 3 ML: .5; 3 SOLUTION RESPIRATORY (INHALATION) at 01:05

## 2017-05-12 RX ADMIN — GABAPENTIN 300 MG: 300 CAPSULE ORAL at 06:05

## 2017-05-12 RX ADMIN — METHYLPREDNISOLONE SODIUM SUCCINATE 40 MG: 125 INJECTION, POWDER, FOR SOLUTION INTRAMUSCULAR; INTRAVENOUS at 12:05

## 2017-05-12 RX ADMIN — IPRATROPIUM BROMIDE AND ALBUTEROL SULFATE 3 ML: .5; 3 SOLUTION RESPIRATORY (INHALATION) at 08:05

## 2017-05-12 RX ADMIN — ALLOPURINOL 100 MG: 100 TABLET ORAL at 09:05

## 2017-05-12 RX ADMIN — ASPIRIN 81 MG: 81 TABLET, COATED ORAL at 09:05

## 2017-05-12 RX ADMIN — FAMOTIDINE 20 MG: 20 TABLET ORAL at 09:05

## 2017-05-12 RX ADMIN — METHYLPREDNISOLONE SODIUM SUCCINATE 40 MG: 125 INJECTION, POWDER, FOR SOLUTION INTRAMUSCULAR; INTRAVENOUS at 06:05

## 2017-05-12 RX ADMIN — STANDARDIZED SENNA CONCENTRATE AND DOCUSATE SODIUM 1 TABLET: 8.6; 5 TABLET, FILM COATED ORAL at 09:05

## 2017-05-12 RX ADMIN — GABAPENTIN 300 MG: 300 CAPSULE ORAL at 02:05

## 2017-05-12 RX ADMIN — AMLODIPINE BESYLATE 10 MG: 10 TABLET ORAL at 09:05

## 2017-05-12 RX ADMIN — HYDROCHLOROTHIAZIDE 12.5 MG: 12.5 TABLET ORAL at 09:05

## 2017-05-12 RX ADMIN — RISPERIDONE 1 MG: 1 TABLET, FILM COATED ORAL at 09:05

## 2017-05-12 NOTE — DISCHARGE SUMMARY
[facility] Hospital Medicine  Discharge Summary      Patient Name: Ghada Beaulieu  MRN: [id number]  Admission Date: 5/10/2017  Hospital Length of Stay: 1 days  Discharge Date and Time: 5/12/2017  7:30 PM  Attending Physician: Dr. Keesha Renner   Discharging Provider: Harleen White NP  Primary Care Provider: Stacie Ramachandran MD      HPI:   Ghada Beaulieu is a 66 y.o. female patient with a PMH of asthma, cerebral palsy, blindness,  congenital heart disease, Hep B and HTN who presents to the ER with c/o wheezing that began 2 days ago. The patient is non-verbal therefore HPI is limited. The patient lives in a group home and was taken to her PCP by one of her caretakers for wheezing with a Hx of asthma, and was subsequently referred to the ER. In the ER the patient was found to have mildly elevated troponin. The case was discussed with Cardiology who recommended Observation and trending troponins.     * No surgery found *      Indwelling Lines/Drains at time of discharge:   Lines/Drains/Airways          No matching active lines, drains, or airways        Hospital Course:   Pt admitted to Medical Surgical Unit for Observation overflow for Asthma Exacerbation.  Pt treated with IV steroids and Duoneb treatments. Cardiology consulted due to possible anginal equivalent.  Mild troponin leak noted likely secondary to hypoxia.  Echo showed EF 65% with moderate pericardial effusion and moderate TVR.  Pericardial effusion of no hemodynamic significance per Cardiology. Pt had positive response to prescribed treatment with no wheezing or distress appreciated.  Pt seen and examined on the date of discharge and deemed suitable for discharge to group home accompanied by caregiver.  Current medications resumed with Prednisone prescribed. Caregiver instructed to add oral nutritional supplement and about patient follow up with PCP.          Consults:   Consults         Status Ordering Provider     Inpatient consult to  Cardiology  Once     Provider:  (Not yet assigned)    Completed ZEN MALIN          Significant Diagnostic Studies:   Imaging Results         X-Ray Chest AP Portable (Final result) Result time:  05/10/17 14:16:31    Final result by Ash Watts MD (05/10/17 14:16:31)    Impression:     Cardiomegaly..      Electronically signed by: ASH WATTS MD  Date:     05/10/17  Time:    14:16     Narrative:    History: Chest pain    Cardiomegaly which appears more pronounced compared to 2/1/16. No infiltrates.              Pending Diagnostic Studies:     None        Final Active Diagnoses:    Diagnosis Date Noted POA    PRINCIPAL PROBLEM:  Asthma [J45.909]  Yes    Malnutrition [E46] 05/12/2017 Unknown    SOB (shortness of breath) [R06.02] 05/10/2017 Yes    Anginal equivalent [I20.8] 05/10/2017 Unknown    Benign essential HTN [I10] 05/10/2017 Unknown      Problems Resolved During this Admission:    Diagnosis Date Noted Date Resolved POA        Discharged Condition: stable    Disposition: Home or Self Care    Follow Up:  Follow-up Information     Follow up with Stacie Ramachandran MD In 3 days.    Specialty:  Internal Medicine    Why:  Please call to schedule an appointment    Contact information:    [address] [place] [id number] [phone]          Patient Instructions:     Diet general     Diet general     Activity as tolerated     Call MD for:  temperature >100.4     Call MD for:  persistent nausea and vomiting or diarrhea     Call MD for:  increased confusion or weakness     Call MD for:  persistent dizziness, light-headedness, or visual disturbances     Call MD for:  difficulty breathing or increased cough       Medications:  Reconciled Home Medications:   Discharge Medication List as of 5/12/2017  4:58 PM      START taking these medications    Details   predniSONE (DELTASONE) 20 MG tablet Take 1 tablet (20 mg total) by mouth once daily., Starting 5/12/2017, Until Wed 5/17/17, Print          CONTINUE these medications which have NOT CHANGED    Details   albuterol (PROVENTIL) 2.5 mg /3 mL (0.083 %) nebulizer solution Take 2.5 mg by nebulization every 6 (six) hours as needed., Until Discontinued, Historical Med      allopurinol (ZYLOPRIM) 100 MG tablet Take 100 mg by mouth once daily. , Until Discontinued, Historical Med      amlodipine (NORVASC) 5 MG tablet Take 10 mg by mouth once daily. , Until Discontinued, Historical Med      aspirin (ECOTRIN) 81 MG EC tablet Take 81 mg by mouth once daily., Until Discontinued, Historical Med      atorvastatin (LIPITOR) 10 MG tablet Take 10 mg by mouth once daily., Until Discontinued, Historical Med      carvedilol (COREG) 25 MG tablet Take 25 mg by mouth 2 (two) times daily with meals., Until Discontinued, Historical Med      gabapentin (NEURONTIN) 300 MG capsule Take 300 mg by mouth 3 (three) times daily., Until Discontinued, Historical Med      glipiZIDE (GLUCOTROL) 5 MG tablet Take 5 mg by mouth daily with breakfast. , Until Discontinued, Historical Med      guaifenesin 100 mg/5 ml (ROBITUSSIN) 100 mg/5 mL syrup Take 200 mg by mouth 3 (three) times daily as needed., Until Discontinued, Historical Med      hydrALAZINE (APRESOLINE) 25 MG tablet Take 25 mg by mouth 2 (two) times daily., Until Discontinued, Historical Med      ipratropium (ATROVENT) 0.02 % nebulizer solution Take 500 mcg by nebulization 4 (four) times daily., Until Discontinued, Historical Med      IPRATROPIUM/ALBUTEROL SULFATE (COMBIVENT INHL) Inhale 1 puff into the lungs 2 (two) times daily. , Until Discontinued, Historical Med      metformin (GLUMETZA) 500 MG (MOD) 24 hr tablet Take 500 mg by mouth 2 (two) times daily with meals. , Until Discontinued, Historical Med      montelukast (SINGULAIR) 10 mg tablet Take 10 mg by mouth every evening., Until Discontinued, Historical Med      naftifine (NAFTIN) 1 % cream Apply topically 2 (two) times daily. Aplly small amount twice daily between toes.,  Until Discontinued, Historical Med      risperidone (RISPERDAL) 1 MG tablet Take 1 tablet (1 mg total) by mouth 2 (two) times daily., Starting 3/12/2017, Until Discontinued, Print      testosterone (ANDROGEL) 1 % (50 mg/5 gram) GlPk Apply topically 2 (two) times daily., Until Discontinued, Historical Med      tolterodine (DETROL) 2 MG Tab Take 1 mg by mouth 2 (two) times daily., Until Discontinued, Historical Med      valsartan-hydrochlorothiazide (DIOVAN-HCT) 320-12.5 mg per tablet Take 1 tablet by mouth once daily., Until Discontinued, Historical Med         STOP taking these medications       valsartan (DIOVAN) 320 MG tablet Comments:   Reason for Stopping:             Time spent on the discharge of patient: 40 minutes    Harleen White NP  Department of Hospital Medicine  [facility]

## 2017-05-12 NOTE — PROGRESS NOTES
Patient wheezing while eating lunch. Fili HEALY consulted and at bedside to evaluate patient and Ras LEON will give patient scheduled breathing tx. Dr. Renner notified.

## 2017-05-12 NOTE — PLAN OF CARE
Problem: Patient Care Overview  Goal: Plan of Care Review  Outcome: Ongoing (interventions implemented as appropriate)  Patient blind and mostly non-verbal, although is able to say she needs the bathroom. Bed alarm set. Remains free of injury. Fall precautions maintained with bed low and wheels locked.  Audible exp wheezing noted without stethoscope.  and covered per s/s. Ambulateded with assistance to restroom. Chart review for 24 hours completed. Will continue to monitor till discharge.

## 2017-05-12 NOTE — CONSULTS
[facility]   Adult Nutrition  Consult Note    SUMMARY     Recommendations  Recommendation/Intervention: 1. Consider removing Cardiac restrictions if patient shows poor intake over the next 48 hours.    2. Add Boost Plus 1 can BID (no flavor preference)  Goals: Intake of % of meals  Nutrition Goal Status: new    Nutrition Discharge Plan  Home on LowNa diet with oral nutrition supplement as needed    Reason for Assessment  Reason for Assessment: identified at risk by screening criteria (unintentional weight loss)  Diagnosis:  (SOB, Asthma)  Relevent Medical History: Cerbral Palsy, Blindness, Hep B, HTN, Nonverbal, see H&P   General Information Comments: Patient is nonverbal, Spoke with caregiver at bedside, she reports patient with good appetite and intake and nor ecent changes in intake amount. She denies any difficulty swallowing. Per [facility] records patient with graudal weight loss over the last 3 years with slight increase in the last few months.    Nutrition Prescription Ordered  Current Diet Order: Cardiac     Nutrition Risk Screen  Nutrition Risk Screen: unintentional loss of 10 lbs or more in the past 2 mos    Nutrition/Diet History  Typical Food/Fluid Intake: caregiver reports good appetite and intake  Food Preferences: none reported    Labs/Tests/Procedures/Meds  Pertinent Labs Reviewed: reviewed  Pertinent Labs Comments: BUN 27, Gluc 292, Alb 2.9  Pertinent Medications Reviewed: reviewed    Physical Findings  Oral/Mouth Cavity:  (WDL)  Skin:  (Pete 16)    Anthropometrics  Height (inches): 55.98 in  Weight Method: Stated  Weight (kg): 37.6 kg  Ideal Body Weight (IBW), Female: 79.9 lb  % Ideal Body Weight, Female (lb): 103.74 lb  BMI (kg/m2): 18.59  BMI Grade: 18.5-24.9 - normal  Usual Body Weight (UBW), k.6 kg  % Usual Body Weight: 86.24  % Weight Change:  (14% wt loss in the last 6 monts with 30% in the last 3 years)  Weight Loss: unintentional    Estimated/Assessed Needs  Weight  Used For Calorie Calculations: 37.6 kg (82 lb 14.3 oz)   Height (cm): 142.2 cm  30 kcal/kg (kcal): 1128 and 35 kcal/kg (kcal): 1316   Weight Used For Protein Calculations: 37.6 kg (82 lb 14.3 oz)  1.2 gm Protein (gm): 45.21 and 1.5 gm Protein (gm): 56.52  Fluid Need Method: RDA Method (1ml/ger or as needed)    Assessment and Plan  Malnutrition  Nutrition Problem:   Moderate Malnutrition    Malnutrition Reason: Acute  Moderate Weight Loss Acute Illness/Injury: Other: 14% weight loss in 2 months per EPIC records  BMI 18.5    Etiology/Related to:   Unknown    As evidenced by:  14% weight loss in 2 months with BMI 18.5 and thin appearance    Treatment Strategy:   1. Add oral nutrition supplement (Boost Plus 1 can BID)  2. Consider removal of Cardiac restriction if intake declines    Nutrition Diagnosis Status:   New    Monitor and Evaluation  Food and Nutrient Intake: food and beverage intake  Food and Nutrient Adminstration: diet order  Physical Activity and Function: nutrition-related ADLs and IADLs  Anthropometric Measurements: weight  Biochemical Data, Medical Tests and Procedures: electrolyte and renal panel, glucose/endocrine profile  Nutrition-Focused Physical Findings: overall appearance    Nutrition Follow-Up  RD Follow-up?: Yes (2x weekly)

## 2017-05-12 NOTE — PROGRESS NOTES
Pt transferred via bed to [facility] room 554; currently stable upon transfer; NELLA Duncan assumed care

## 2017-05-12 NOTE — ASSESSMENT & PLAN NOTE
Nutrition Problem:   Moderate Malnutrition    Malnutrition Reason: Acute  Moderate Weight Loss Acute Illness/Injury: Other: 14% weight loss in 2 months per EPIC records  BMI 18.5    Etiology/Related to:   Unknown    As evidenced by:  14% weight loss in 2 months with BMI 18.5 and thin appearance    Treatment Strategy:   1. Add oral nutrition supplement (Boost Plus 1 can BID)  2. Consider removal of Cardiac restriction if intake declines    Nutrition Diagnosis Status:   New

## 2017-05-12 NOTE — NURSING
Patient discharged home with care giver from [facility]. Bassam, caregiver-was given discharge instructions. Bassam was instructed to continue patient's home medications as ordered. Bassam was also told that patient's blood glucose levels may be elevated for a while due to prednisone and its effects on blood glucose. Bassam, caregiver verbalized understanding. Bassam stated that she will resume patient's home meds as instructed.

## 2017-05-12 NOTE — PLAN OF CARE
Pt to d/c back to group home with no needs. Attempted to schedule f/u up appt with pt's PCP, Dr. Stacie Ramachandran. Staff at pt's PCP was already gone for the day, and MSW was told their answering service could not schedule appts. PCP information added to AVS with instruction to call and schedule a f/u appt.      05/12/17 151   Discharge Assessment   Assessment Type Final Discharge Note   Discharge Plan A Group Home   Patient/Family In Agreement With Plan yes

## 2017-05-12 NOTE — PLAN OF CARE
Problem: Patient Care Overview  Goal: Plan of Care Review  Outcome: Ongoing (interventions implemented as appropriate)  Recommendation/Intervention: 1. Consider removing Cardiac restrictions if patient shows poor intake over the next 48 hours. 2. Add Boost Plus 1 can BID (no flavor preference)  Goals: Intake of % of meals  Nutrition Goal Status: new

## 2017-05-12 NOTE — PLAN OF CARE
Met with patient and [facility]  Manager, Carter Maria in hospital room.  Patient asleep; and [facility] Manager states that patient has been with them over 10 years.  Discussed with [facility] Manager that should patient require Accuchecks and/or insulin, that patient will be transferred to one of their other [facility] s so that nursing will be there for her.  Ms. Maria stating that should patient need to be transferred from current  to another one of their s, there will be no delay in this process.    Spoke with hospitalist as to whether patient will require Accuchecks and/or insulin post-hospitalization, with hospitalist indicating that patient will not require Accuchecks and/or insulin post-hospitalization, as patient's blood sugars have been elevated due to steroids.      D/C PLAN:   Return to [facility] and with outpatient follow-up as recommended

## 2017-05-12 NOTE — PLAN OF CARE
Problem: Patient Care Overview  Goal: Plan of Care Review  Outcome: Ongoing (interventions implemented as appropriate)  Fall precautions maintained and no falls on shift.  Patient nonverbal- Nonverbal indicators do not show pain  Patient repositions independently and no PU  Breathing tx scheduled

## 2017-05-13 LAB
ESTIMATED AVG GLUCOSE: 126 MG/DL
HBA1C MFR BLD HPLC: 6 %
POCT GLUCOSE: 436 MG/DL (ref 70–110)

## 2017-05-19 ENCOUNTER — TELEPHONE (OUTPATIENT)
Dept: ADMINISTRATIVE | Facility: OTHER | Age: 67
End: 2017-05-19

## 2017-05-19 NOTE — TELEPHONE ENCOUNTER
Attempt #: 1  This  attempted to reach patient/caregiver regarding recent ED viistis.  also wanted to provide resource and wasn't able to leave a message due to phone kept ringing.